# Patient Record
Sex: MALE | Race: WHITE
[De-identification: names, ages, dates, MRNs, and addresses within clinical notes are randomized per-mention and may not be internally consistent; named-entity substitution may affect disease eponyms.]

---

## 2018-03-13 PROBLEM — Z00.00 ENCOUNTER FOR PREVENTIVE HEALTH EXAMINATION: Status: ACTIVE | Noted: 2018-03-13

## 2018-05-11 ENCOUNTER — APPOINTMENT (OUTPATIENT)
Dept: CARDIOLOGY | Facility: CLINIC | Age: 83
End: 2018-05-11

## 2018-09-21 ENCOUNTER — APPOINTMENT (OUTPATIENT)
Dept: CARDIOLOGY | Facility: CLINIC | Age: 83
End: 2018-09-21
Payer: MEDICARE

## 2018-09-21 ENCOUNTER — NON-APPOINTMENT (OUTPATIENT)
Age: 83
End: 2018-09-21

## 2018-09-21 VITALS
HEART RATE: 72 BPM | BODY MASS INDEX: 19.29 KG/M2 | OXYGEN SATURATION: 99 % | WEIGHT: 113 LBS | SYSTOLIC BLOOD PRESSURE: 130 MMHG | HEIGHT: 64 IN | DIASTOLIC BLOOD PRESSURE: 76 MMHG

## 2018-09-21 DIAGNOSIS — Z82.49 FAMILY HISTORY OF ISCHEMIC HEART DISEASE AND OTHER DISEASES OF THE CIRCULATORY SYSTEM: ICD-10-CM

## 2018-09-21 DIAGNOSIS — Z87.891 PERSONAL HISTORY OF NICOTINE DEPENDENCE: ICD-10-CM

## 2018-09-21 DIAGNOSIS — I35.8 OTHER NONRHEUMATIC AORTIC VALVE DISORDERS: ICD-10-CM

## 2018-09-21 DIAGNOSIS — I25.10 ATHEROSCLEROTIC HEART DISEASE OF NATIVE CORONARY ARTERY W/OUT ANGINA PECTORIS: ICD-10-CM

## 2018-09-21 PROCEDURE — 93000 ELECTROCARDIOGRAM COMPLETE: CPT

## 2018-09-21 PROCEDURE — 99204 OFFICE O/P NEW MOD 45 MIN: CPT

## 2018-09-22 ENCOUNTER — NON-APPOINTMENT (OUTPATIENT)
Age: 83
End: 2018-09-22

## 2018-09-22 PROBLEM — Z82.49 FAMILY HISTORY OF ACUTE MYOCARDIAL INFARCTION: Status: ACTIVE | Noted: 2018-09-22

## 2018-09-22 PROBLEM — Z87.891 FORMER SMOKER: Status: ACTIVE | Noted: 2018-09-22

## 2018-09-22 PROBLEM — I35.8 AORTIC VALVE SCLEROSIS: Status: ACTIVE | Noted: 2018-09-22

## 2018-09-22 PROBLEM — I25.10 CAD (CORONARY ARTERY DISEASE): Status: ACTIVE | Noted: 2018-09-21

## 2018-09-26 LAB
ALBUMIN SERPL ELPH-MCNC: 4 G/DL
ALP BLD-CCNC: 53 U/L
ALT SERPL-CCNC: 9 U/L
ANION GAP SERPL CALC-SCNC: 11 MMOL/L
AST SERPL-CCNC: 20 U/L
BILIRUB SERPL-MCNC: 0.3 MG/DL
BUN SERPL-MCNC: 24 MG/DL
CALCIUM SERPL-MCNC: 9.2 MG/DL
CHLORIDE SERPL-SCNC: 105 MMOL/L
CHOLEST SERPL-MCNC: 178 MG/DL
CHOLEST/HDLC SERPL: 2.5 RATIO
CO2 SERPL-SCNC: 25 MMOL/L
CREAT SERPL-MCNC: 0.81 MG/DL
GLUCOSE SERPL-MCNC: 74 MG/DL
HDLC SERPL-MCNC: 71 MG/DL
LDLC SERPL CALC-MCNC: 96 MG/DL
POTASSIUM SERPL-SCNC: 4.6 MMOL/L
PROT SERPL-MCNC: 6.5 G/DL
SODIUM SERPL-SCNC: 141 MMOL/L
TRIGL SERPL-MCNC: 54 MG/DL

## 2018-10-20 ENCOUNTER — APPOINTMENT (OUTPATIENT)
Dept: CARDIOLOGY | Facility: CLINIC | Age: 83
End: 2018-10-20
Payer: MEDICARE

## 2018-10-20 PROCEDURE — 93306 TTE W/DOPPLER COMPLETE: CPT

## 2018-10-30 ENCOUNTER — EMERGENCY (EMERGENCY)
Facility: HOSPITAL | Age: 83
LOS: 0 days | Discharge: HOME | End: 2018-10-30
Attending: EMERGENCY MEDICINE | Admitting: EMERGENCY MEDICINE
Payer: MEDICARE

## 2018-10-30 VITALS
DIASTOLIC BLOOD PRESSURE: 72 MMHG | OXYGEN SATURATION: 98 % | HEART RATE: 65 BPM | SYSTOLIC BLOOD PRESSURE: 142 MMHG | RESPIRATION RATE: 18 BRPM | TEMPERATURE: 98 F

## 2018-10-30 VITALS
SYSTOLIC BLOOD PRESSURE: 156 MMHG | WEIGHT: 110.01 LBS | OXYGEN SATURATION: 98 % | DIASTOLIC BLOOD PRESSURE: 78 MMHG | RESPIRATION RATE: 18 BRPM | TEMPERATURE: 98 F | HEIGHT: 64 IN | HEART RATE: 68 BPM

## 2018-10-30 DIAGNOSIS — Z88.0 ALLERGY STATUS TO PENICILLIN: ICD-10-CM

## 2018-10-30 DIAGNOSIS — N39.0 URINARY TRACT INFECTION, SITE NOT SPECIFIED: ICD-10-CM

## 2018-10-30 DIAGNOSIS — T83.9XXA UNSPECIFIED COMPLICATION OF GENITOURINARY PROSTHETIC DEVICE, IMPLANT AND GRAFT, INITIAL ENCOUNTER: ICD-10-CM

## 2018-10-30 DIAGNOSIS — R33.9 RETENTION OF URINE, UNSPECIFIED: ICD-10-CM

## 2018-10-30 LAB
ALBUMIN SERPL ELPH-MCNC: 3.6 G/DL — SIGNIFICANT CHANGE UP (ref 3.3–5)
ALP SERPL-CCNC: 53 U/L — SIGNIFICANT CHANGE UP (ref 40–120)
ALT FLD-CCNC: 19 U/L — SIGNIFICANT CHANGE UP (ref 12–78)
ANION GAP SERPL CALC-SCNC: 5 MMOL/L — SIGNIFICANT CHANGE UP (ref 5–17)
APPEARANCE UR: ABNORMAL
AST SERPL-CCNC: 24 U/L — SIGNIFICANT CHANGE UP (ref 15–37)
BACTERIA # UR AUTO: ABNORMAL
BASOPHILS # BLD AUTO: 0.03 K/UL — SIGNIFICANT CHANGE UP (ref 0–0.2)
BASOPHILS NFR BLD AUTO: 0.4 % — SIGNIFICANT CHANGE UP (ref 0–2)
BILIRUB SERPL-MCNC: 0.3 MG/DL — SIGNIFICANT CHANGE UP (ref 0.2–1.2)
BILIRUB UR-MCNC: NEGATIVE — SIGNIFICANT CHANGE UP
BUN SERPL-MCNC: 36 MG/DL — HIGH (ref 7–23)
CALCIUM SERPL-MCNC: 8.6 MG/DL — SIGNIFICANT CHANGE UP (ref 8.5–10.1)
CHLORIDE SERPL-SCNC: 105 MMOL/L — SIGNIFICANT CHANGE UP (ref 96–108)
CO2 SERPL-SCNC: 28 MMOL/L — SIGNIFICANT CHANGE UP (ref 22–31)
COLOR SPEC: YELLOW — SIGNIFICANT CHANGE UP
CREAT SERPL-MCNC: 1.22 MG/DL — SIGNIFICANT CHANGE UP (ref 0.5–1.3)
DIFF PNL FLD: ABNORMAL
EOSINOPHIL # BLD AUTO: 0 K/UL — SIGNIFICANT CHANGE UP (ref 0–0.5)
EOSINOPHIL NFR BLD AUTO: 0 % — SIGNIFICANT CHANGE UP (ref 0–6)
EPI CELLS # UR: SIGNIFICANT CHANGE UP
GLUCOSE SERPL-MCNC: 99 MG/DL — SIGNIFICANT CHANGE UP (ref 70–99)
GLUCOSE UR QL: NEGATIVE MG/DL — SIGNIFICANT CHANGE UP
HCT VFR BLD CALC: 34.6 % — LOW (ref 39–50)
HGB BLD-MCNC: 11.4 G/DL — LOW (ref 13–17)
HYALINE CASTS # UR AUTO: ABNORMAL /LPF
IMM GRANULOCYTES NFR BLD AUTO: 0.3 % — SIGNIFICANT CHANGE UP (ref 0–1.5)
KETONES UR-MCNC: NEGATIVE — SIGNIFICANT CHANGE UP
LEUKOCYTE ESTERASE UR-ACNC: ABNORMAL
LYMPHOCYTES # BLD AUTO: 0.48 K/UL — LOW (ref 1–3.3)
LYMPHOCYTES # BLD AUTO: 6.3 % — LOW (ref 13–44)
MANUAL SMEAR VERIFICATION: SIGNIFICANT CHANGE UP
MCHC RBC-ENTMCNC: 28.2 PG — SIGNIFICANT CHANGE UP (ref 27–34)
MCHC RBC-ENTMCNC: 32.9 GM/DL — SIGNIFICANT CHANGE UP (ref 32–36)
MCV RBC AUTO: 85.6 FL — SIGNIFICANT CHANGE UP (ref 80–100)
MONOCYTES # BLD AUTO: 0.65 K/UL — SIGNIFICANT CHANGE UP (ref 0–0.9)
MONOCYTES NFR BLD AUTO: 8.6 % — SIGNIFICANT CHANGE UP (ref 2–14)
NEUTROPHILS # BLD AUTO: 6.38 K/UL — SIGNIFICANT CHANGE UP (ref 1.8–7.4)
NEUTROPHILS NFR BLD AUTO: 84.4 % — HIGH (ref 43–77)
NITRITE UR-MCNC: NEGATIVE — SIGNIFICANT CHANGE UP
NRBC # BLD: 0 /100 WBCS — SIGNIFICANT CHANGE UP (ref 0–0)
PH UR: 7 — SIGNIFICANT CHANGE UP (ref 5–8)
PLAT MORPH BLD: NORMAL — SIGNIFICANT CHANGE UP
PLATELET # BLD AUTO: 183 K/UL — SIGNIFICANT CHANGE UP (ref 150–400)
PLATELET COUNT - ESTIMATE: NORMAL — SIGNIFICANT CHANGE UP
POTASSIUM SERPL-MCNC: 4.6 MMOL/L — SIGNIFICANT CHANGE UP (ref 3.5–5.3)
POTASSIUM SERPL-SCNC: 4.6 MMOL/L — SIGNIFICANT CHANGE UP (ref 3.5–5.3)
PROT SERPL-MCNC: 7.3 GM/DL — SIGNIFICANT CHANGE UP (ref 6–8.3)
PROT UR-MCNC: 100 MG/DL
RBC # BLD: 4.04 M/UL — LOW (ref 4.2–5.8)
RBC # FLD: 14.6 % — HIGH (ref 10.3–14.5)
RBC BLD AUTO: NORMAL — SIGNIFICANT CHANGE UP
RBC CASTS # UR COMP ASSIST: >50 /HPF (ref 0–4)
SODIUM SERPL-SCNC: 138 MMOL/L — SIGNIFICANT CHANGE UP (ref 135–145)
SP GR SPEC: 1.01 — SIGNIFICANT CHANGE UP (ref 1.01–1.02)
UROBILINOGEN FLD QL: NEGATIVE MG/DL — SIGNIFICANT CHANGE UP
WBC # BLD: 7.56 K/UL — SIGNIFICANT CHANGE UP (ref 3.8–10.5)
WBC # FLD AUTO: 7.56 K/UL — SIGNIFICANT CHANGE UP (ref 3.8–10.5)
WBC UR QL: >50

## 2018-10-30 PROCEDURE — 99284 EMERGENCY DEPT VISIT MOD MDM: CPT

## 2018-10-30 RX ORDER — MOXIFLOXACIN HYDROCHLORIDE TABLETS, 400 MG 400 MG/1
1 TABLET, FILM COATED ORAL
Qty: 14 | Refills: 0
Start: 2018-10-30 | End: 2018-11-05

## 2018-10-30 NOTE — ED ADULT NURSE NOTE - CHIEF COMPLAINT QUOTE
pt BIBEMS from [facility] for urinary retention. pt self cath's 4x day at baseline, unable to obtain urine. c/o lower abd pain.

## 2018-10-30 NOTE — ED PROVIDER NOTE - NEUROLOGICAL, MLM
Appendectomy, laparoscopic, pediatric  05/09/2017    Active  NDENNING  Drainage of abdominal abscess  05/09/2017    Active  NDENNING
Alert and oriented, no focal deficits, no motor or sensory deficits.

## 2018-10-30 NOTE — ED PROVIDER NOTE - PROGRESS NOTE DETAILS
Patient urinalysis with probable UTI.  Patient agreeable to marley catheter for symptoms of pain with straight catheterization.  Will treat with cipro, he will follow up with his urologist for further evaluation.  Strict return precautions reviewed if fever develops or worsening pain -Julia Young PA-C

## 2018-10-30 NOTE — ED PROVIDER NOTE - MEDICAL DECISION MAKING DETAILS
Pt with urinary retention.  Cathed in ED.  Berry placed by nursing.  Probably UTI, treat with Cipro.

## 2018-10-30 NOTE — ED PROVIDER NOTE - OBJECTIVE STATEMENT
86 yo M with PMHx of a neurogenic bladder, normally self catheterizes 4x a day, presents to ED reporting that since yesterday he has been having pain with catheterization and cannot tolerate doing it and now his bladder is full and distended and he is having urgency sensation and suprapubic discomfort.  He denies fever, chills, N/V.

## 2018-10-30 NOTE — ED ADULT NURSE NOTE - OBJECTIVE STATEMENT
Pt self cath's - has only been getting approx. 150mL, c/o bladder distention and pain.  Pt straight cath'd upon arrival with approx. 900mL cloudy yellow urine removed.  Pt with no complaints at this time.  Total relief of discomfort.

## 2018-10-30 NOTE — ED ADULT TRIAGE NOTE - CHIEF COMPLAINT QUOTE
Name band;
pt BIBEMS from [facility] for urinary retention. pt self cath's 4x day at baseline, unable to obtain urine. c/o lower abd pain.

## 2018-11-01 LAB
-  AMPICILLIN/SULBACTAM: SIGNIFICANT CHANGE UP
-  CEFAZOLIN: SIGNIFICANT CHANGE UP
-  GENTAMICIN: SIGNIFICANT CHANGE UP
-  OXACILLIN: SIGNIFICANT CHANGE UP
-  RIFAMPIN: SIGNIFICANT CHANGE UP
-  TETRACYCLINE: SIGNIFICANT CHANGE UP
-  TRIMETHOPRIM/SULFAMETHOXAZOLE: SIGNIFICANT CHANGE UP
-  VANCOMYCIN: SIGNIFICANT CHANGE UP
CULTURE RESULTS: SIGNIFICANT CHANGE UP
METHOD TYPE: SIGNIFICANT CHANGE UP
ORGANISM # SPEC MICROSCOPIC CNT: SIGNIFICANT CHANGE UP
ORGANISM # SPEC MICROSCOPIC CNT: SIGNIFICANT CHANGE UP
SPECIMEN SOURCE: SIGNIFICANT CHANGE UP

## 2018-11-02 RX ORDER — AZTREONAM 2 G
1 VIAL (EA) INJECTION
Qty: 20 | Refills: 0
Start: 2018-11-02 | End: 2018-11-11

## 2018-11-02 NOTE — ED POST DISCHARGE NOTE - OTHER COMMUNICATION
Daughter called back and requested abx treatment and to send results to this urologist ([facility]- [phone]) Rx for bactrim sent. -Donnie Lee PA-C

## 2018-11-02 NOTE — ED POST DISCHARGE NOTE - RESULT SUMMARY
+UC. Called and spoke with pt. Pt was told that he had a urine infection but was told to hold off on antibitoics. Pt states he is currently symptomatic and has a f/u with his urologist next week to f/u with his recent visit to  [facility]. Pt was given the risks and advised that having an infection should be treated with antibiotics because things could get worse and progress to a worsening infection. Pt still adamant to hold off and not take any antibiotics. Strict return pprecautions given to to the pt. -Donnie Lee PA-C

## 2018-11-11 ENCOUNTER — EMERGENCY (EMERGENCY)
Facility: HOSPITAL | Age: 83
LOS: 0 days | Discharge: HOME | End: 2018-11-11
Attending: FAMILY MEDICINE | Admitting: EMERGENCY MEDICINE
Payer: MEDICARE

## 2018-11-11 VITALS
SYSTOLIC BLOOD PRESSURE: 127 MMHG | TEMPERATURE: 98 F | HEART RATE: 60 BPM | DIASTOLIC BLOOD PRESSURE: 67 MMHG | OXYGEN SATURATION: 100 % | RESPIRATION RATE: 18 BRPM

## 2018-11-11 VITALS
WEIGHT: 111.99 LBS | OXYGEN SATURATION: 98 % | DIASTOLIC BLOOD PRESSURE: 63 MMHG | RESPIRATION RATE: 18 BRPM | SYSTOLIC BLOOD PRESSURE: 139 MMHG | HEART RATE: 59 BPM | TEMPERATURE: 97 F | HEIGHT: 65 IN

## 2018-11-11 DIAGNOSIS — Z87.891 PERSONAL HISTORY OF NICOTINE DEPENDENCE: ICD-10-CM

## 2018-11-11 DIAGNOSIS — Z88.0 ALLERGY STATUS TO PENICILLIN: ICD-10-CM

## 2018-11-11 DIAGNOSIS — R31.9 HEMATURIA, UNSPECIFIED: ICD-10-CM

## 2018-11-11 DIAGNOSIS — R33.9 RETENTION OF URINE, UNSPECIFIED: ICD-10-CM

## 2018-11-11 LAB
APPEARANCE UR: CLEAR — SIGNIFICANT CHANGE UP
BACTERIA # UR AUTO: ABNORMAL
BILIRUB UR-MCNC: NEGATIVE — SIGNIFICANT CHANGE UP
COLOR SPEC: YELLOW — SIGNIFICANT CHANGE UP
DIFF PNL FLD: ABNORMAL
EPI CELLS # UR: SIGNIFICANT CHANGE UP
GLUCOSE UR QL: NEGATIVE MG/DL — SIGNIFICANT CHANGE UP
KETONES UR-MCNC: NEGATIVE — SIGNIFICANT CHANGE UP
LEUKOCYTE ESTERASE UR-ACNC: ABNORMAL
NITRITE UR-MCNC: POSITIVE
PH UR: 6 — SIGNIFICANT CHANGE UP (ref 5–8)
PROT UR-MCNC: 100 MG/DL
RBC CASTS # UR COMP ASSIST: >50 /HPF (ref 0–4)
SP GR SPEC: 1.01 — SIGNIFICANT CHANGE UP (ref 1.01–1.02)
UROBILINOGEN FLD QL: NEGATIVE MG/DL — SIGNIFICANT CHANGE UP
WBC UR QL: >50

## 2018-11-11 PROCEDURE — 99284 EMERGENCY DEPT VISIT MOD MDM: CPT

## 2018-11-11 RX ORDER — AZTREONAM 2 G
1 VIAL (EA) INJECTION
Qty: 10 | Refills: 0
Start: 2018-11-11 | End: 2018-11-15

## 2018-11-11 NOTE — ED ADULT NURSE NOTE - NSIMPLEMENTINTERV_GEN_ALL_ED
Implemented All Fall Risk Interventions:  [place] to call system. Call bell, personal items and telephone within reach. Instruct patient to call for assistance. Room bathroom lighting operational. Non-slip footwear when patient is off stretcher. Physically safe environment: no spills, clutter or unnecessary equipment. Stretcher in lowest position, wheels locked, appropriate side rails in place. Provide visual cue, wrist band, yellow gown, etc. Monitor gait and stability. Monitor for mental status changes and reorient to person, place, and time. Review medications for side effects contributing to fall risk. Reinforce activity limits and safety measures with patient and family.

## 2018-11-11 NOTE — ED PROVIDER NOTE - OBJECTIVE STATEMENT
85 yo pt with hx of neurogenic bladder and straight cath 4 times a day presents for bloody urine and unable to void since 3am.  Last week pt with episode of urinary retention and uti and currently on bactrim.  Per daughter, today pt cathed and had blood return.  Pt has not been able to urinate since then.  No fall, no travel, pt lives at [facility].  Urologist = Dr. Bone.

## 2018-11-11 NOTE — ED ADULT NURSE REASSESSMENT NOTE - NS ED NURSE REASSESS COMMENT FT1
marley drained 1400ml of urine. Changed to leg bag as per md order. pt tolerated well. Pt discharged in a wheelchair. Daughter agrees to take him to [facility].

## 2018-11-11 NOTE — ED ADULT NURSE REASSESSMENT NOTE - NS ED NURSE REASSESS COMMENT FT1
marley catheter inserted as per md order. pt tolerated well. Urine drained and sent to lab. Pending results. pt states feeling better. Daughter at bedside. Will cont to monitor for safety and comfort.

## 2018-11-11 NOTE — ED PROVIDER NOTE - MEDICAL DECISION MAKING DETAILS
85 yo pt presents for urinary retention.  Possible false passage with straight cath attempt earlier today.  plan cont abx and leave Berry in place.  Follow up with urologist in 1-3 days 87 yo pt presents for urinary retention.  Possible false passage with straight cath attempt earlier today.  plan cont abx (bactrim) and leave Berry in place.  Follow up with urologist in 1-3 days

## 2018-11-11 NOTE — ED ADULT NURSE NOTE - OBJECTIVE STATEMENT
85 y/o male awake alert and oriented x4 BIBEMS with daughter from [facility] c/o urinary retention and hematuria. as per daughter, pt has hx dystonic bladder (straight cath himself 4x a day) was seen in ED last week for similar sx. pt had marley cath placed from last visit in ED and was removed by Dr. Rocha in [place] (Urologist) on Thursday. Pt is currently on Bactrim for UTI. Pt noticed hematuria and difficulty emptying bladder with straight cath this morning. Last time pt cath himself was 10:30pm last night. c/o lower abd pain. Denies fever/chills, dysuria.

## 2018-11-13 RX ORDER — NITROFURANTOIN MACROCRYSTAL 50 MG
1 CAPSULE ORAL
Qty: 14 | Refills: 0
Start: 2018-11-13 | End: 2018-11-19

## 2018-11-13 NOTE — ED POST DISCHARGE NOTE - RESULT SUMMARY
urine culture positive for e.coli, resistant to bactrim which pt was prescribed, called pt's assisted living, advised RN of results, sent Rx for macrobid which bacteria is sensitive to to pt's pharmacy and faxed copy of culture results to assisted living, advised RN to have pt f/u with urologist bill Goodrich PA-C

## 2018-11-18 ENCOUNTER — EMERGENCY (EMERGENCY)
Facility: HOSPITAL | Age: 83
LOS: 0 days | Discharge: HOME | End: 2018-11-18
Attending: EMERGENCY MEDICINE | Admitting: EMERGENCY MEDICINE
Payer: MEDICARE

## 2018-11-18 VITALS
WEIGHT: 106.04 LBS | OXYGEN SATURATION: 100 % | HEART RATE: 58 BPM | SYSTOLIC BLOOD PRESSURE: 130 MMHG | TEMPERATURE: 98 F | HEIGHT: 63 IN | DIASTOLIC BLOOD PRESSURE: 82 MMHG | RESPIRATION RATE: 18 BRPM

## 2018-11-18 DIAGNOSIS — T83.091A OTHER MECHANICAL COMPLICATION OF INDWELLING URETHRAL CATHETER, INITIAL ENCOUNTER: ICD-10-CM

## 2018-11-18 DIAGNOSIS — T83.9XXA UNSPECIFIED COMPLICATION OF GENITOURINARY PROSTHETIC DEVICE, IMPLANT AND GRAFT, INITIAL ENCOUNTER: ICD-10-CM

## 2018-11-18 DIAGNOSIS — R33.9 RETENTION OF URINE, UNSPECIFIED: ICD-10-CM

## 2018-11-18 DIAGNOSIS — Z88.0 ALLERGY STATUS TO PENICILLIN: ICD-10-CM
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
## 2018-11-18 PROCEDURE — 99284 EMERGENCY DEPT VISIT MOD MDM: CPT | Mod: 25

## 2018-11-18 NOTE — ED PROVIDER NOTE - NSFOLLOWUPINSTRUCTIONS_ED_ALL_ED_FT
please follow up with your doctor in 2-3 days.   keep Berry in place until follow up.   continue with antibiotic as prescribed.

## 2018-11-18 NOTE — ED ADULT NURSE NOTE - CHPI ED NUR SYMPTOMS NEG
no tingling/no weakness/no fever/no dizziness/no decreased eating/drinking/no chills/no nausea/no vomiting

## 2018-11-18 NOTE — ED ADULT NURSE NOTE - OBJECTIVE STATEMENT
Pt presented to ED for marley cath complications. As per pt's son at the bedside, pt's marley cath has not been draining since 2100hrs per nursing home staff. Upon  arrival to ED, no drainage noted on the FC and pt c/o bladder discomfort due to fullness.

## 2018-11-18 NOTE — ED ADULT NURSE NOTE - NSIMPLEMENTINTERV_GEN_ALL_ED
Implemented All Universal Safety Interventions:  [place] to call system. Call bell, personal items and telephone within reach. Instruct patient to call for assistance. Room bathroom lighting operational. Non-slip footwear when patient is off stretcher. Physically safe environment: no spills, clutter or unnecessary equipment. Stretcher in lowest position, wheels locked, appropriate side rails in place.

## 2018-11-18 NOTE — ED ADULT NURSE REASSESSMENT NOTE - NS ED NURSE REASSESS COMMENT FT1
Pt marley cath changed to 16FR catheter at this time. Pt tolerated the procedure well. 600 ml of initial return noted. Currently on ABX for UTI. Pt okay to be discharged at this time.

## 2018-11-18 NOTE — ED PROVIDER NOTE - OBJECTIVE STATEMENT
87 y/o male in ED with marley in place c/o retention since 2100 last night.   pt denies any fever, HA, cp, sob, n/v/d/abd pain.    states drank some water with still no drainage.   pt states currently on new abx for uti

## 2019-04-04 NOTE — ED ADULT TRIAGE NOTE - MODE OF ARRIVAL
See your Primary Doctor and Cardiologist in next week for follow up and reevaluation.    Change LABETALOL 600 mg TWICE a day (take too 300 mg tablets twice a day).    Follow heart healthy diet.    Seek immediate medical care for new/worsening symptoms/concerns. EMS

## 2020-04-07 ENCOUNTER — EMERGENCY (EMERGENCY)
Facility: HOSPITAL | Age: 85
LOS: 0 days | Discharge: HOME | End: 2020-04-08
Attending: EMERGENCY MEDICINE
Payer: MEDICARE

## 2020-04-07 VITALS
SYSTOLIC BLOOD PRESSURE: 147 MMHG | DIASTOLIC BLOOD PRESSURE: 64 MMHG | RESPIRATION RATE: 18 BRPM | TEMPERATURE: 99 F | WEIGHT: 160.06 LBS | HEIGHT: 66 IN | HEART RATE: 94 BPM | OXYGEN SATURATION: 96 %

## 2020-04-07 VITALS
DIASTOLIC BLOOD PRESSURE: 90 MMHG | OXYGEN SATURATION: 97 % | RESPIRATION RATE: 18 BRPM | SYSTOLIC BLOOD PRESSURE: 141 MMHG | HEART RATE: 84 BPM | TEMPERATURE: 99 F

## 2020-04-07 DIAGNOSIS — R33.9 RETENTION OF URINE, UNSPECIFIED: ICD-10-CM

## 2020-04-07 DIAGNOSIS — S46.912A STRAIN OF UNSPECIFIED MUSCLE, FASCIA AND TENDON AT SHOULDER AND UPPER ARM LEVEL, LEFT ARM, INITIAL ENCOUNTER: ICD-10-CM
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
## 2020-04-07 DIAGNOSIS — S16.1XXA STRAIN OF MUSCLE, FASCIA AND TENDON AT NECK LEVEL, INITIAL ENCOUNTER: ICD-10-CM
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
## 2020-04-07 DIAGNOSIS — Z88.0 ALLERGY STATUS TO PENICILLIN: ICD-10-CM

## 2020-04-07 DIAGNOSIS — M25.512 PAIN IN LEFT SHOULDER: ICD-10-CM

## 2020-04-07 DIAGNOSIS — S09.90XA UNSPECIFIED INJURY OF HEAD, INITIAL ENCOUNTER: ICD-10-CM

## 2020-04-07 PROCEDURE — 70450 CT HEAD/BRAIN W/O DYE: CPT

## 2020-04-07 PROCEDURE — 73060 X-RAY EXAM OF HUMERUS: CPT | Mod: 26,LT

## 2020-04-07 PROCEDURE — 72170 X-RAY EXAM OF PELVIS: CPT | Mod: 26

## 2020-04-07 PROCEDURE — 99284 EMERGENCY DEPT VISIT MOD MDM: CPT

## 2020-04-07 PROCEDURE — 73030 X-RAY EXAM OF SHOULDER: CPT | Mod: LT

## 2020-04-07 PROCEDURE — 72125 CT NECK SPINE W/O DYE: CPT

## 2020-04-07 PROCEDURE — 71045 X-RAY EXAM CHEST 1 VIEW: CPT

## 2020-04-07 PROCEDURE — 73030 X-RAY EXAM OF SHOULDER: CPT | Mod: 26,LT

## 2020-04-07 PROCEDURE — 70450 CT HEAD/BRAIN W/O DYE: CPT | Mod: 26

## 2020-04-07 PROCEDURE — 73060 X-RAY EXAM OF HUMERUS: CPT | Mod: LT

## 2020-04-07 PROCEDURE — 71045 X-RAY EXAM CHEST 1 VIEW: CPT | Mod: 26

## 2020-04-07 PROCEDURE — 72125 CT NECK SPINE W/O DYE: CPT | Mod: 26

## 2020-04-07 PROCEDURE — 72170 X-RAY EXAM OF PELVIS: CPT

## 2020-04-07 NOTE — ED PROVIDER NOTE - MUSCULOSKELETAL, MLM
+LUE in sling. RUE nontender, good AROM without pain. +b/l SLR 35+ degrees without pain, no focal tenderness. +Left shoulder mildly tender, no deformity. LUE distal nontender, normal radial pulse, hand digits move well. Neck no midline tenderness nor deformity, supple with some decreased ROM and some discomfort. Back and pelvis nontender, stable.

## 2020-04-07 NOTE — ED PROVIDER NOTE - ENMT, MLM
Airway patent, Nasal mucosa clear. Mouth with slightly dry mucosa. Throat has no vesicles, no oropharyngeal exudates and uvula is midline. NC/AT. -battles. No clinical evidence of facial injury.

## 2020-04-07 NOTE — ED PROVIDER NOTE - PATIENT PORTAL LINK FT
You can access the FollowMyHealth Patient Portal offered by [facility] by registering at the following website: [email or web address]. By joining [employer]’s FollowMyHealth portal, you will also be able to view your health information using other applications (apps) compatible with our system.

## 2020-04-07 NOTE — ED PROVIDER NOTE - CLINICAL SUMMARY MEDICAL DECISION MAKING FREE TEXT BOX
86 y/o male with PMHx of IBS, GERD, BPH, and idiopathic neuropathy [facility] c/o neck pain and left shoulder pain. Pt does not meet criteria for neuro/trauma alert.   Plan: CT head and c-spine, XR chest, pelvis, left shoulder/humerus. Observe and reassess.

## 2020-04-07 NOTE — ED ADULT TRIAGE NOTE - CHIEF COMPLAINT QUOTE
Pt BIBEMS c/o unwitnessed fall. Not on any anticoags. Denies LOC, head injury. A/ox3 at triage. C/O left scapula pain.

## 2020-04-07 NOTE — ED PROVIDER NOTE - OBJECTIVE STATEMENT
88 y/o male with PMHx of IBS, GERD, BPH, and idiopathic neuropathy presents to the ED BIBEMS from [facility] assisted living s/p fall. Pt states he lost his balance, put out his left arm and it got wedged against a wall, called for help for some time before someone assisted him. Denies falling to the floor. Endorses +neck pain and +left shoulder pain. Describes pain as an aching. No LOC. Also notes 3 days of mild +cough. No fever or SOB. Walks with a cane at baseline. Not on antiplatelet nor AC meds.

## 2020-04-07 NOTE — ED PROVIDER NOTE - CPE EDP RESP NORM
Quality 110: Preventive Care And Screening: Influenza Immunization: Influenza Immunization previously received during influenza season Quality 111:Pneumonia Vaccination Status For Older Adults: Pneumococcal Vaccination Previously Received Detail Level: Detailed normal...

## 2020-04-07 NOTE — ED PROVIDER NOTE - NSFOLLOWUPINSTRUCTIONS_ED_ALL_ED_FT
Diagnosis: Head injury, cervical strain, L shoulder strain, fall from standing height.  Tylenol 325 mg 2 tabs every 6 hours as needed for pain to neck/shoulder.  Continue regular medications as per routine.  Follow up with own doctor or Wilmer ALAN this week.  Fall precautions.  We encourage Physical therapy of neck, L shoulder.      ED evaluation and management discussed with the patient and family (if available) in detail.  Close PMD follow up encouraged.  Strict ED return instructions discussed in detail and patient given the opportunity to ask any questions about their discharge diagnosis and instructions. Patient verbalized understanding.        Closed Head Injury    A closed head injury is an injury to your head that may or may not involve a traumatic brain injury (TBI). Symptoms of TBI can be short or long lasting and include headache, dizziness, interference with memory or speech, fatigue, confusion, changes in sleep, mood changes, nausea, depression/anxiety, and dulling of senses. Make sure to obtain proper rest which includes getting plenty of sleep, avoiding excessive visual stimulation, and avoiding activities that may cause physical or mental stress. Avoid any situation where there is potential for another head injury, including sports.    SEEK IMMEDIATE MEDICAL CARE IF YOU HAVE ANY OF THE FOLLOWING SYMPTOMS: unusual drowsiness, vomiting, severe dizziness, seizures, lightheadedness, muscular weakness, different pupil sizes, visual changes, or clear or bloody discharge from your ears or nose.        Cervical Strain    WHAT YOU NEED TO KNOW:    A cervical strain is a stretched or torn muscle or tendon in your neck. Tendons are strong tissues that connect muscles to bones.    DISCHARGE INSTRUCTIONS:    Return to the emergency department if:     You have pain or numbness from your shoulder down to your hand.      You have problems with your vision, hearing, or balance.      You feel confused or cannot concentrate.      You have problems with movement and strength.    Call your doctor if:     You have increased swelling or pain in your neck.       You have questions or concerns about your condition or care.    Medicines: You may need any of the following:     Acetaminophen decreases pain and fever. It is available without a doctor's order. Ask how much to take and how often to take it. Follow directions. Read the labels of all other medicines you are using to see if they also contain acetaminophen, or ask your doctor or pharmacist. Acetaminophen can cause liver damage if not taken correctly. Do not use more than 4 grams (4,000 milligrams) total of acetaminophen in one day.       NSAIDs, such as ibuprofen, help decrease swelling, pain, and fever. This medicine is available with or without a doctor's order. NSAIDs can cause stomach bleeding or kidney problems in certain people. If you take blood thinner medicine, always ask your healthcare provider if NSAIDs are safe for you. Always read the medicine label and follow directions.      Muscle relaxers help decrease pain and muscle spasms.      Prescription pain medicine may be given. Ask your healthcare provider how to take this medicine safely. Some prescription pain medicines contain acetaminophen. Do not take other medicines that contain acetaminophen without talking to your healthcare provider. Too much acetaminophen may cause liver damage. Prescription pain medicine may cause constipation. Ask your healthcare provider how to prevent or treat constipation.       Take your medicine as directed. Contact your healthcare provider if you think your medicine is not helping or if you have side effects. Tell him or her if you are allergic to any medicine. Keep a list of the medicines, vitamins, and herbs you take. Include the amounts, and when and why you take them. Bring the list or the pill bottles to follow-up visits. Carry your medicine list with you in case of an emergency.    Manage your symptoms:     Apply heat on your neck for 15 to 20 minutes, 4 to 6 times a day or as directed. Heat helps decrease pain, stiffness, and muscle spasms.      Begin gentle neck exercises as soon as you can move your neck without pain. Exercises will help decrease stiffness and improve the strength and movement of your neck. Ask your healthcare provider what kind of exercises you should do.      Gradually return to your usual activities as directed. Stop if you have pain. Avoid activities that can cause more damage to your neck, such as heavy lifting or strenuous exercise.      Sleep without a pillow to help decrease pain. Instead, roll a small towel tightly and place it under your neck.       Go to physical therapy as directed. A physical therapist teaches you exercises to help improve movement and strength, and to decrease pain.     Prevent another neck injury:     Drive safely. Make sure everyone in your car wears a seatbelt. A seatbelt can save your life if you are in an accident. Do not use your cell phone when you are driving. This could distract you and cause an accident. Pull over if you need to make a call or send a text message.       Wear helmets, lifejackets, and protective gear. Always wear a helmet when you ride a bike or motorcycle, go skiing, or play sports that could cause a head injury. Wear protective equipment when you play sports. Wear a lifejacket when you are on a boat or doing water sports.     Follow up with your doctor as directed: You may be referred to an orthopedist or physical therapies. Write down your questions so you remember to ask them during your visits. Diagnosis: Head injury, cervical strain, L shoulder strain, fall from standing height.  Tylenol 325 mg 2 tabs every 6 hours as needed for pain to neck/shoulder.  Continue regular medications as per routine.  Follow up with own doctor or Wilmer ALAN this week.  Fall precautions.  We encourage Physical therapy of neck, L shoulder.      ED evaluation and management discussed with the patient and family (if available) in detail.  Close PMD follow up encouraged.  Strict ED return instructions discussed in detail and patient given the opportunity to ask any questions about their discharge diagnosis and instructions. Patient verbalized understanding.        Closed Head Injury    A closed head injury is an injury to your head that may or may not involve a traumatic brain injury (TBI). Symptoms of TBI can be short or long lasting and include headache, dizziness, interference with memory or speech, fatigue, confusion, changes in sleep, mood changes, nausea, depression/anxiety, and dulling of senses. Make sure to obtain proper rest which includes getting plenty of sleep, avoiding excessive visual stimulation, and avoiding activities that may cause physical or mental stress. Avoid any situation where there is potential for another head injury, including sports.    SEEK IMMEDIATE MEDICAL CARE IF YOU HAVE ANY OF THE FOLLOWING SYMPTOMS: unusual drowsiness, vomiting, severe dizziness, seizures, lightheadedness, muscular weakness, different pupil sizes, visual changes, or clear or bloody discharge from your ears or nose.        Cervical Strain    WHAT YOU NEED TO KNOW:    A cervical strain is a stretched or torn muscle or tendon in your neck. Tendons are strong tissues that connect muscles to bones.    DISCHARGE INSTRUCTIONS:    Return to the emergency department if:     You have pain or numbness from your shoulder down to your hand.      You have problems with your vision, hearing, or balance.      You feel confused or cannot concentrate.      You have problems with movement and strength.    Call your doctor if:     You have increased swelling or pain in your neck.       You have questions or concerns about your condition or care.    Medicines: You may need any of the following:     Acetaminophen decreases pain and fever. It is available without a doctor's order. Ask how much to take and how often to take it. Follow directions. Read the labels of all other medicines you are using to see if they also contain acetaminophen, or ask your doctor or pharmacist. Acetaminophen can cause liver damage if not taken correctly. Do not use more than 4 grams (4,000 milligrams) total of acetaminophen in one day.       NSAIDs, such as ibuprofen, help decrease swelling, pain, and fever. This medicine is available with or without a doctor's order. NSAIDs can cause stomach bleeding or kidney problems in certain people. If you take blood thinner medicine, always ask your healthcare provider if NSAIDs are safe for you. Always read the medicine label and follow directions.      Muscle relaxers help decrease pain and muscle spasms.      Prescription pain medicine may be given. Ask your healthcare provider how to take this medicine safely. Some prescription pain medicines contain acetaminophen. Do not take other medicines that contain acetaminophen without talking to your healthcare provider. Too much acetaminophen may cause liver damage. Prescription pain medicine may cause constipation. Ask your healthcare provider how to prevent or treat constipation.       Take your medicine as directed. Contact your healthcare provider if you think your medicine is not helping or if you have side effects. Tell him or her if you are allergic to any medicine. Keep a list of the medicines, vitamins, and herbs you take. Include the amounts, and when and why you take them. Bring the list or the pill bottles to follow-up visits. Carry your medicine list with you in case of an emergency.    Manage your symptoms:     Apply heat on your neck for 15 to 20 minutes, 4 to 6 times a day or as directed. Heat helps decrease pain, stiffness, and muscle spasms.      Begin gentle neck exercises as soon as you can move your neck without pain. Exercises will help decrease stiffness and improve the strength and movement of your neck. Ask your healthcare provider what kind of exercises you should do.      Gradually return to your usual activities as directed. Stop if you have pain. Avoid activities that can cause more damage to your neck, such as heavy lifting or strenuous exercise.      Sleep without a pillow to help decrease pain. Instead, roll a small towel tightly and place it under your neck.       Go to physical therapy as directed. A physical therapist teaches you exercises to help improve movement and strength, and to decrease pain.     Prevent another neck injury:     Drive safely. Make sure everyone in your car wears a seatbelt. A seatbelt can save your life if you are in an accident. Do not use your cell phone when you are driving. This could distract you and cause an accident. Pull over if you need to make a call or send a text message.       Wear helmets, lifejackets, and protective gear. Always wear a helmet when you ride a bike or motorcycle, go skiing, or play sports that could cause a head injury. Wear protective equipment when you play sports. Wear a lifejacket when you are on a boat or doing water sports.     Follow up with your doctor as directed: You may be referred to an orthopedist or physical therapies. Write down your questions so you remember to ask them during your visits.        Muscle Strain  A muscle strain is an injury that happens when a muscle is stretched longer than normal. This can happen during a fall, sports, or lifting. This can tear some muscle fibers. Usually, recovery from muscle strain takes 1–2 weeks. Complete healing normally takes 5–6 weeks.  This condition is first treated with PRICE therapy. This involves:  Protecting your muscle from being injured again.Resting your injured muscle.Icing your injured muscle.Applying pressure (compression) to your injured muscle. This may be done with a splint or elastic bandage.Raising (elevating) your injured muscle.Your doctor may also recommend medicine for pain.  Follow these instructions at home:  If you have a splint:     Wear the splint as told by your doctor. Take it off only as told by your doctor.Loosen the splint if your fingers or toes tingle, get numb, or turn cold and blue.Keep the splint clean.If the splint is not waterproof:  Do not let it get wet.Cover it with a watertight covering when you take a bath or a shower.Managing pain, stiffness, and swelling        If directed, put ice on your injured area.  If you have a removable splint, take it off as told by your doctor.Put ice in a plastic bag.Place a towel between your skin and the bag.Leave the ice on for 20 minutes, 2–3 times a day.Move your fingers or toes often. This helps to avoid stiffness and lessen swelling.Raise your injured area above the level of your heart while you are sitting or lying down.Wear an elastic bandage as told by your doctor. Make sure it is not too tight.General instructions     Take over-the-counter and prescription medicines only as told by your doctor.Limit your activity. Rest your injured muscle as told by your doctor. Your doctor may say that gentle movements are okay.If physical therapy was prescribed, do exercises as told by your doctor.Do not put pressure on any part of the splint until it is fully hardened. This may take many hours.Do not use any products that contain nicotine or tobacco, such as cigarettes and e-cigarettes. These can delay bone healing. If you need help quitting, ask your doctor.Warm up before you exercise. This helps to prevent more muscle strains.Ask your doctor when it is safe to drive if you have a splint.Keep all follow-up visits as told by your doctor. This is important.Contact a doctor if:  You have more pain or swelling in your injured area.Get help right away if:  You have any of these problems in your injured area:  You have numbness.You have tingling.You lose a lot of strength.Summary  A muscle strain is an injury that happens when a muscle is stretched longer than normal.This condition is first treated with PRICE therapy. This includes protecting, resting, icing, adding pressure, and raising your injury.Limit your activity. Rest your injured muscle as told by your doctor. Your doctor may say that gentle movements are okay.Warm up before you exercise. This helps to prevent more muscle strains.This information is not intended to replace advice given to you by your health care provider. Make sure you discuss any questions you have with your health care provider.

## 2020-04-07 NOTE — ED PROVIDER NOTE - SECONDARY DIAGNOSIS.
Cervical strain, acute, initial encounter Shoulder strain, left, initial encounter Fall from standing, initial encounter

## 2020-04-07 NOTE — ED PROVIDER NOTE - PROGRESS NOTE DETAILS
Ghanhsyam MCKEON for ED attending, Dr. Morin: Left sling removed, no deformity, +AFROM, will do ambulation trial and d/c back to [facility] if passes. Dr. Morin:  Informed by ED RN that pt passed ambulation trial.  Pt stable for D/C back to [facility] facility, calling for transport.

## 2020-04-07 NOTE — ED PROVIDER NOTE - CARE PLAN
Principal Discharge DX:	Closed head injury, initial encounter  Secondary Diagnosis:	Cervical strain, acute, initial encounter  Secondary Diagnosis:	Shoulder strain, left, initial encounter  Secondary Diagnosis:	Fall from standing, initial encounter

## 2020-04-07 NOTE — ED ADULT NURSE NOTE - OBJECTIVE STATEMENT
pt presents to ED with complaints of unwitnessed fall. pt complaining of left shoulder, humeral, and scapular pain. pt also endorsing neck pain. no other complaints. awaiting CT and Xray results.

## 2020-04-08 PROBLEM — R33.9 RETENTION OF URINE, UNSPECIFIED: Chronic | Status: ACTIVE | Noted: 2018-11-18

## 2020-04-08 NOTE — ED ADULT NURSE REASSESSMENT NOTE - NS ED NURSE REASSESS COMMENT FT1
Patient discharged to daughter Wilda and son-in-law assisted into to car to be returned to [facility].
Patient received at 2215 from BEVERLEY Alvarez.  Patient VS stable as charted.  Patient is has poor short term memory and requires frequent reminders.  patient states he is unable to ambulate without a walker.  [facility] does not have a walker in the ED.  Patient's daughter to be called.  Patient is to be discharged home as per MD Morin.
Patient ambulated with 2 person assist.  Patient to be discharged to family and returned to [facility].  Patient report given to Sean Isabel Nurse at [facility] at 2243. Will continue to monitor patient until family arrives for discharge.  Patient assisted to wheelchair at this time.

## 2020-04-11 ENCOUNTER — INPATIENT (INPATIENT)
Facility: HOSPITAL | Age: 85
LOS: 11 days | DRG: 177 | End: 2020-04-23
Attending: INTERNAL MEDICINE | Admitting: INTERNAL MEDICINE
Payer: MEDICARE

## 2020-04-11 VITALS
WEIGHT: 160.06 LBS | HEART RATE: 98 BPM | HEIGHT: 66 IN | TEMPERATURE: 100 F | RESPIRATION RATE: 18 BRPM | SYSTOLIC BLOOD PRESSURE: 147 MMHG | DIASTOLIC BLOOD PRESSURE: 91 MMHG | OXYGEN SATURATION: 95 %

## 2020-04-11 DIAGNOSIS — N40.1 BENIGN PROSTATIC HYPERPLASIA WITH LOWER URINARY TRACT SYMPTOMS: ICD-10-CM

## 2020-04-11 DIAGNOSIS — E43 UNSPECIFIED SEVERE PROTEIN-CALORIE MALNUTRITION: ICD-10-CM

## 2020-04-11 DIAGNOSIS — Z66 DO NOT RESUSCITATE: ICD-10-CM

## 2020-04-11 DIAGNOSIS — B96.4 PROTEUS (MIRABILIS) (MORGANII) AS THE CAUSE OF DISEASES CLASSIFIED ELSEWHERE: ICD-10-CM

## 2020-04-11 DIAGNOSIS — K58.9 IRRITABLE BOWEL SYNDROME WITHOUT DIARRHEA: ICD-10-CM

## 2020-04-11 DIAGNOSIS — N31.2 FLACCID NEUROPATHIC BLADDER, NOT ELSEWHERE CLASSIFIED: ICD-10-CM

## 2020-04-11 DIAGNOSIS — G60.9 HEREDITARY AND IDIOPATHIC NEUROPATHY, UNSPECIFIED: ICD-10-CM

## 2020-04-11 DIAGNOSIS — I48.0 PAROXYSMAL ATRIAL FIBRILLATION: ICD-10-CM

## 2020-04-11 DIAGNOSIS — J12.89 OTHER VIRAL PNEUMONIA: ICD-10-CM

## 2020-04-11 DIAGNOSIS — Z79.899 OTHER LONG TERM (CURRENT) DRUG THERAPY: ICD-10-CM

## 2020-04-11 DIAGNOSIS — F03.90 UNSPECIFIED DEMENTIA WITHOUT BEHAVIORAL DISTURBANCE: ICD-10-CM

## 2020-04-11 DIAGNOSIS — J96.01 ACUTE RESPIRATORY FAILURE WITH HYPOXIA: ICD-10-CM

## 2020-04-11 DIAGNOSIS — U07.1 COVID-19: ICD-10-CM

## 2020-04-11 DIAGNOSIS — K21.9 GASTRO-ESOPHAGEAL REFLUX DISEASE WITHOUT ESOPHAGITIS: ICD-10-CM

## 2020-04-11 DIAGNOSIS — Z88.8 ALLERGY STATUS TO OTHER DRUGS, MEDICAMENTS AND BIOLOGICAL SUBSTANCES STATUS: ICD-10-CM

## 2020-04-11 DIAGNOSIS — R33.8 OTHER RETENTION OF URINE: ICD-10-CM

## 2020-04-11 DIAGNOSIS — I08.0 RHEUMATIC DISORDERS OF BOTH MITRAL AND AORTIC VALVES: ICD-10-CM

## 2020-04-11 DIAGNOSIS — F05 DELIRIUM DUE TO KNOWN PHYSIOLOGICAL CONDITION: ICD-10-CM

## 2020-04-11 DIAGNOSIS — E87.0 HYPEROSMOLALITY AND HYPERNATREMIA: ICD-10-CM

## 2020-04-11 DIAGNOSIS — Z51.5 ENCOUNTER FOR PALLIATIVE CARE: ICD-10-CM

## 2020-04-11 DIAGNOSIS — Z88.0 ALLERGY STATUS TO PENICILLIN: ICD-10-CM

## 2020-04-11 LAB
ADD ON TEST-SPECIMEN IN LAB: SIGNIFICANT CHANGE UP
ALBUMIN SERPL ELPH-MCNC: 2.6 G/DL — LOW (ref 3.3–5)
ALP SERPL-CCNC: 68 U/L — SIGNIFICANT CHANGE UP (ref 40–120)
ALT FLD-CCNC: 67 U/L — SIGNIFICANT CHANGE UP (ref 12–78)
ANION GAP SERPL CALC-SCNC: 6 MMOL/L — SIGNIFICANT CHANGE UP (ref 5–17)
APTT BLD: 30.5 SEC — SIGNIFICANT CHANGE UP (ref 27.5–36.3)
AST SERPL-CCNC: 128 U/L — HIGH (ref 15–37)
BASOPHILS # BLD AUTO: 0.02 K/UL — SIGNIFICANT CHANGE UP (ref 0–0.2)
BASOPHILS NFR BLD AUTO: 0.2 % — SIGNIFICANT CHANGE UP (ref 0–2)
BILIRUB SERPL-MCNC: 0.4 MG/DL — SIGNIFICANT CHANGE UP (ref 0.2–1.2)
BUN SERPL-MCNC: 28 MG/DL — HIGH (ref 7–23)
CALCIUM SERPL-MCNC: 8.5 MG/DL — SIGNIFICANT CHANGE UP (ref 8.5–10.1)
CHLORIDE SERPL-SCNC: 102 MMOL/L — SIGNIFICANT CHANGE UP (ref 96–108)
CK SERPL-CCNC: 1678 U/L — HIGH (ref 26–308)
CO2 SERPL-SCNC: 26 MMOL/L — SIGNIFICANT CHANGE UP (ref 22–31)
CREAT SERPL-MCNC: 1.42 MG/DL — HIGH (ref 0.5–1.3)
EOSINOPHIL # BLD AUTO: 0 K/UL — SIGNIFICANT CHANGE UP (ref 0–0.5)
EOSINOPHIL NFR BLD AUTO: 0 % — SIGNIFICANT CHANGE UP (ref 0–6)
FIBRINOGEN PPP-MCNC: 1276 MG/DL — HIGH (ref 320–520)
GLUCOSE SERPL-MCNC: 123 MG/DL — HIGH (ref 70–99)
HCT VFR BLD CALC: 39 % — SIGNIFICANT CHANGE UP (ref 39–50)
HGB BLD-MCNC: 12.9 G/DL — LOW (ref 13–17)
IMM GRANULOCYTES NFR BLD AUTO: 0.5 % — SIGNIFICANT CHANGE UP (ref 0–1.5)
INR BLD: 1.47 RATIO — HIGH (ref 0.88–1.16)
LDH SERPL L TO P-CCNC: 539 U/L — HIGH (ref 84–241)
LYMPHOCYTES # BLD AUTO: 0.35 K/UL — LOW (ref 1–3.3)
LYMPHOCYTES # BLD AUTO: 3.3 % — LOW (ref 13–44)
MCHC RBC-ENTMCNC: 27.7 PG — SIGNIFICANT CHANGE UP (ref 27–34)
MCHC RBC-ENTMCNC: 33.1 GM/DL — SIGNIFICANT CHANGE UP (ref 32–36)
MCV RBC AUTO: 83.9 FL — SIGNIFICANT CHANGE UP (ref 80–100)
MONOCYTES # BLD AUTO: 0.58 K/UL — SIGNIFICANT CHANGE UP (ref 0–0.9)
MONOCYTES NFR BLD AUTO: 5.5 % — SIGNIFICANT CHANGE UP (ref 2–14)
NEUTROPHILS # BLD AUTO: 9.64 K/UL — HIGH (ref 1.8–7.4)
NEUTROPHILS NFR BLD AUTO: 90.5 % — HIGH (ref 43–77)
PLATELET # BLD AUTO: 195 K/UL — SIGNIFICANT CHANGE UP (ref 150–400)
POTASSIUM SERPL-MCNC: 4.9 MMOL/L — SIGNIFICANT CHANGE UP (ref 3.5–5.3)
POTASSIUM SERPL-SCNC: 4.9 MMOL/L — SIGNIFICANT CHANGE UP (ref 3.5–5.3)
PROT SERPL-MCNC: 7.7 GM/DL — SIGNIFICANT CHANGE UP (ref 6–8.3)
PROTHROM AB SERPL-ACNC: 16.5 SEC — HIGH (ref 10–12.9)
RBC # BLD: 4.65 M/UL — SIGNIFICANT CHANGE UP (ref 4.2–5.8)
RBC # FLD: 13.5 % — SIGNIFICANT CHANGE UP (ref 10.3–14.5)
SARS-COV-2 RNA SPEC QL NAA+PROBE: DETECTED
SODIUM SERPL-SCNC: 134 MMOL/L — LOW (ref 135–145)
WBC # BLD: 10.64 K/UL — HIGH (ref 3.8–10.5)
WBC # FLD AUTO: 10.64 K/UL — HIGH (ref 3.8–10.5)

## 2020-04-11 PROCEDURE — 92523 SPEECH SOUND LANG COMPREHEN: CPT | Mod: GN

## 2020-04-11 PROCEDURE — 80053 COMPREHEN METABOLIC PANEL: CPT

## 2020-04-11 PROCEDURE — 84145 PROCALCITONIN (PCT): CPT

## 2020-04-11 PROCEDURE — 86140 C-REACTIVE PROTEIN: CPT

## 2020-04-11 PROCEDURE — 84480 ASSAY TRIIODOTHYRONINE (T3): CPT

## 2020-04-11 PROCEDURE — 84443 ASSAY THYROID STIM HORMONE: CPT

## 2020-04-11 PROCEDURE — 36415 COLL VENOUS BLD VENIPUNCTURE: CPT

## 2020-04-11 PROCEDURE — 85379 FIBRIN DEGRADATION QUANT: CPT

## 2020-04-11 PROCEDURE — 83615 LACTATE (LD) (LDH) ENZYME: CPT

## 2020-04-11 PROCEDURE — 85025 COMPLETE CBC W/AUTO DIFF WBC: CPT

## 2020-04-11 PROCEDURE — 85027 COMPLETE CBC AUTOMATED: CPT

## 2020-04-11 PROCEDURE — 71045 X-RAY EXAM CHEST 1 VIEW: CPT | Mod: 26

## 2020-04-11 PROCEDURE — 71045 X-RAY EXAM CHEST 1 VIEW: CPT

## 2020-04-11 PROCEDURE — 87635 SARS-COV-2 COVID-19 AMP PRB: CPT

## 2020-04-11 PROCEDURE — 93306 TTE W/DOPPLER COMPLETE: CPT

## 2020-04-11 PROCEDURE — 80048 BASIC METABOLIC PNL TOTAL CA: CPT

## 2020-04-11 PROCEDURE — 85730 THROMBOPLASTIN TIME PARTIAL: CPT

## 2020-04-11 PROCEDURE — 81001 URINALYSIS AUTO W/SCOPE: CPT

## 2020-04-11 PROCEDURE — 92526 ORAL FUNCTION THERAPY: CPT | Mod: GN

## 2020-04-11 PROCEDURE — 82728 ASSAY OF FERRITIN: CPT

## 2020-04-11 PROCEDURE — 92507 TX SP LANG VOICE COMM INDIV: CPT | Mod: GN

## 2020-04-11 PROCEDURE — 87186 SC STD MICRODIL/AGAR DIL: CPT

## 2020-04-11 PROCEDURE — 83735 ASSAY OF MAGNESIUM: CPT

## 2020-04-11 PROCEDURE — 92610 EVALUATE SWALLOWING FUNCTION: CPT | Mod: GN

## 2020-04-11 PROCEDURE — 84484 ASSAY OF TROPONIN QUANT: CPT

## 2020-04-11 PROCEDURE — 87086 URINE CULTURE/COLONY COUNT: CPT

## 2020-04-11 PROCEDURE — 93010 ELECTROCARDIOGRAM REPORT: CPT

## 2020-04-11 PROCEDURE — 84436 ASSAY OF TOTAL THYROXINE: CPT

## 2020-04-11 RX ORDER — HEPARIN SODIUM 5000 [USP'U]/ML
INJECTION INTRAVENOUS; SUBCUTANEOUS
Qty: 25000 | Refills: 0 | Status: DISCONTINUED | OUTPATIENT
Start: 2020-04-11 | End: 2020-04-13

## 2020-04-11 RX ORDER — HEPARIN SODIUM 5000 [USP'U]/ML
2000 INJECTION INTRAVENOUS; SUBCUTANEOUS EVERY 6 HOURS
Refills: 0 | Status: DISCONTINUED | OUTPATIENT
Start: 2020-04-11 | End: 2020-04-13

## 2020-04-11 RX ORDER — HEPARIN SODIUM 5000 [USP'U]/ML
4500 INJECTION INTRAVENOUS; SUBCUTANEOUS ONCE
Refills: 0 | Status: COMPLETED | OUTPATIENT
Start: 2020-04-11 | End: 2020-04-11

## 2020-04-11 RX ORDER — ACETAMINOPHEN 500 MG
650 TABLET ORAL EVERY 6 HOURS
Refills: 0 | Status: DISCONTINUED | OUTPATIENT
Start: 2020-04-11 | End: 2020-04-23

## 2020-04-11 RX ORDER — HEPARIN SODIUM 5000 [USP'U]/ML
4500 INJECTION INTRAVENOUS; SUBCUTANEOUS EVERY 6 HOURS
Refills: 0 | Status: DISCONTINUED | OUTPATIENT
Start: 2020-04-11 | End: 2020-04-13

## 2020-04-11 RX ADMIN — HEPARIN SODIUM 4500 UNIT(S): 5000 INJECTION INTRAVENOUS; SUBCUTANEOUS at 23:54

## 2020-04-11 RX ADMIN — HEPARIN SODIUM 1100 UNIT(S)/HR: 5000 INJECTION INTRAVENOUS; SUBCUTANEOUS at 23:53

## 2020-04-11 NOTE — ED ADULT NURSE NOTE - OBJECTIVE STATEMENT
Flu like s/sx, cough & fever starting today. Sick contacts at facility. Airborne/contact precautions initiated. O2 sat 92-96% on 2L/NC

## 2020-04-11 NOTE — ED PROVIDER NOTE - CARE PLAN
Principal Discharge DX:	COVID-19  Secondary Diagnosis:	Viral pneumonia  Secondary Diagnosis:	New onset atrial fibrillation  Secondary Diagnosis:	Hypoxemia

## 2020-04-11 NOTE — ED PROVIDER NOTE - OBJECTIVE STATEMENT
86 y/o male with a PMHx of Urinary retention, presents to the ED from [facility]. Pt was last here 4 days ago. Was brought in for fever and cough. +exposure to COVID. Pt is a poor historian, but states that he generally feels unwell. No specific complaints. Timeline is unclear. No other complaints at this time.

## 2020-04-11 NOTE — ED ADULT NURSE NOTE - NSIMPLEMENTINTERV_GEN_ALL_ED
Implemented All Fall with Harm Risk Interventions:  [place] to call system. Call bell, personal items and telephone within reach. Instruct patient to call for assistance. Room bathroom lighting operational. Non-slip footwear when patient is off stretcher. Physically safe environment: no spills, clutter or unnecessary equipment. Stretcher in lowest position, wheels locked, appropriate side rails in place. Provide visual cue, wrist band, yellow gown, etc. Monitor gait and stability. Monitor for mental status changes and reorient to person, place, and time. Review medications for side effects contributing to fall risk. Reinforce activity limits and safety measures with patient and family. Provide visual clues: red socks.

## 2020-04-12 LAB
APPEARANCE UR: ABNORMAL
APTT BLD: 137.1 SEC — CRITICAL HIGH (ref 27.5–36.3)
APTT BLD: 70 SEC — HIGH (ref 27.5–36.3)
APTT BLD: 76.2 SEC — HIGH (ref 27.5–36.3)
BILIRUB UR-MCNC: NEGATIVE — SIGNIFICANT CHANGE UP
COLOR SPEC: YELLOW — SIGNIFICANT CHANGE UP
CRP SERPL-MCNC: 22.74 MG/DL — HIGH (ref 0–0.4)
DIFF PNL FLD: ABNORMAL
FERRITIN SERPL-MCNC: 726 NG/ML — HIGH (ref 30–400)
GLUCOSE UR QL: NEGATIVE MG/DL — SIGNIFICANT CHANGE UP
HCT VFR BLD CALC: 35.6 % — LOW (ref 39–50)
HGB BLD-MCNC: 12 G/DL — LOW (ref 13–17)
KETONES UR-MCNC: ABNORMAL
LEUKOCYTE ESTERASE UR-ACNC: ABNORMAL
MCHC RBC-ENTMCNC: 27.3 PG — SIGNIFICANT CHANGE UP (ref 27–34)
MCHC RBC-ENTMCNC: 33.7 GM/DL — SIGNIFICANT CHANGE UP (ref 32–36)
MCV RBC AUTO: 81.1 FL — SIGNIFICANT CHANGE UP (ref 80–100)
NITRITE UR-MCNC: NEGATIVE — SIGNIFICANT CHANGE UP
PH UR: 9 — HIGH (ref 5–8)
PLATELET # BLD AUTO: 200 K/UL — SIGNIFICANT CHANGE UP (ref 150–400)
PROCALCITONIN SERPL-MCNC: 0.89 NG/ML — HIGH (ref 0.02–0.1)
PROT UR-MCNC: 500 MG/DL
RBC # BLD: 4.39 M/UL — SIGNIFICANT CHANGE UP (ref 4.2–5.8)
RBC # FLD: 13.6 % — SIGNIFICANT CHANGE UP (ref 10.3–14.5)
SP GR SPEC: 1.01 — SIGNIFICANT CHANGE UP (ref 1.01–1.02)
TROPONIN I SERPL-MCNC: 0.48 NG/ML — HIGH (ref 0.01–0.04)
TROPONIN I SERPL-MCNC: 0.5 NG/ML — HIGH (ref 0.01–0.04)
UROBILINOGEN FLD QL: NEGATIVE MG/DL — SIGNIFICANT CHANGE UP
WBC # BLD: 9.65 K/UL — SIGNIFICANT CHANGE UP (ref 3.8–10.5)
WBC # FLD AUTO: 9.65 K/UL — SIGNIFICANT CHANGE UP (ref 3.8–10.5)

## 2020-04-12 PROCEDURE — 99223 1ST HOSP IP/OBS HIGH 75: CPT | Mod: CS,AI

## 2020-04-12 RX ORDER — HYDROXYCHLOROQUINE SULFATE 200 MG
800 TABLET ORAL DAILY
Refills: 0 | Status: COMPLETED | OUTPATIENT
Start: 2020-04-12 | End: 2020-04-12

## 2020-04-12 RX ORDER — ALBUTEROL 90 UG/1
2 AEROSOL, METERED ORAL EVERY 4 HOURS
Refills: 0 | Status: DISCONTINUED | OUTPATIENT
Start: 2020-04-12 | End: 2020-04-23

## 2020-04-12 RX ORDER — ACETAMINOPHEN 500 MG
650 TABLET ORAL EVERY 4 HOURS
Refills: 0 | Status: DISCONTINUED | OUTPATIENT
Start: 2020-04-12 | End: 2020-04-23

## 2020-04-12 RX ORDER — DILTIAZEM HCL 120 MG
60 CAPSULE, EXT RELEASE 24 HR ORAL EVERY 8 HOURS
Refills: 0 | Status: DISCONTINUED | OUTPATIENT
Start: 2020-04-12 | End: 2020-04-14

## 2020-04-12 RX ORDER — HYDROXYCHLOROQUINE SULFATE 200 MG
400 TABLET ORAL EVERY 24 HOURS
Refills: 0 | Status: COMPLETED | OUTPATIENT
Start: 2020-04-13 | End: 2020-04-16

## 2020-04-12 RX ORDER — HYDROXYCHLOROQUINE SULFATE 200 MG
TABLET ORAL
Refills: 0 | Status: COMPLETED | OUTPATIENT
Start: 2020-04-12 | End: 2020-04-16

## 2020-04-12 RX ADMIN — HEPARIN SODIUM 900 UNIT(S)/HR: 5000 INJECTION INTRAVENOUS; SUBCUTANEOUS at 22:39

## 2020-04-12 RX ADMIN — HEPARIN SODIUM 900 UNIT(S)/HR: 5000 INJECTION INTRAVENOUS; SUBCUTANEOUS at 07:37

## 2020-04-12 RX ADMIN — Medication 60 MILLIGRAM(S): at 21:26

## 2020-04-12 RX ADMIN — Medication 80 MILLIGRAM(S): at 21:26

## 2020-04-12 RX ADMIN — Medication 60 MILLIGRAM(S): at 15:21

## 2020-04-12 RX ADMIN — Medication 800 MILLIGRAM(S): at 16:55

## 2020-04-12 RX ADMIN — HEPARIN SODIUM 0 UNIT(S)/HR: 5000 INJECTION INTRAVENOUS; SUBCUTANEOUS at 06:24

## 2020-04-12 RX ADMIN — HEPARIN SODIUM 900 UNIT(S)/HR: 5000 INJECTION INTRAVENOUS; SUBCUTANEOUS at 15:32

## 2020-04-12 NOTE — H&P ADULT - HISTORY OF PRESENT ILLNESS
86 y/o M PMHx significant for IBS, GERD, BPH, urinary retention, and idiopathic neuropathy presents to  from [facility] for further evaluation and management of a 4 day history of intermittent subjective fevers, malaise, shortness of breath, and dry cough. As noted the patient has a known notable recent history for (+) sick contacts at the facility. SaO2 on supplemental O2 w/ 2L/min nasal cannula-> 92%. In the ED the patient was found to be in AFib w/ RVR. Labs => WBC 10.64, Hgb/Hct 12.9/39.0, Fibrinogen 1279, INR 1.47, D-dimer 718, Na 134, BUN/Cr 28/1.42, Alb 2.6, , CRP 22.74, Ferritin 726, , CPK 1678, UA (+), COVID-19 (+). CXR => bilateral infiltrates. In the ED the patient was started on a Heparin gtt per protocol. 88 y/o M PMHx significant for IBS, GERD, BPH, urinary retention, and idiopathic neuropathy presents to  from [facility] for further evaluation and management of a 4 day history of intermittent subjective fevers, malaise, shortness of breath, and dry cough. As noted the patient has a known notable recent history for (+) sick contacts at the facility. SaO2 on supplemental O2 w/ 2L/min nasal cannula-> 92%. In the ED the patient was found to be in AFib w/ RVR. Labs => WBC 10.64, Hgb/Hct 12.9/39.0, Fibrinogen 1279, INR 1.47, D-dimer 718, Na 134, BUN/Cr 28/1.42, Alb 2.6, , CRP 22.74, Ferritin 726, , CPK 1678, UA (+), COVID-19 (+). CXR => bilateral infiltrates. In the ED the patient was started on a Heparin gtt per protocol.

## 2020-04-12 NOTE — H&P ADULT - ASSESSMENT
88 y/o M PMHx significant for IBS, GERD, BPH, urinary retention, and idiopathic neuropathy presents to  from [facility] for further evaluation and management of a 4 day history of intermittent subjective fevers, malaise, shortness of breath, and dry cough. As noted the patient has a known notable recent history for (+) sick contacts at the facility. SaO2 on supplemental O2 w/ 2L/min nasal cannula-> 92%. In the ED the patient was found to be in AFib w/ RVR. Labs => WBC 10.64, Hgb/Hct 12.9/39.0, Fibrinogen 1279, INR 1.47, D-dimer 718, Na 134, BUN/Cr 28/1.42, Alb 2.6, , CRP 22.74, Ferritin 726, , CPK 1678, UA (+), COVID-19 (+). CXR => bilateral infiltrates. In the ED the patient was started on a Heparin gtt per protocol. 86 y/o M PMHx significant for IBS, GERD, BPH, urinary retention, and idiopathic neuropathy presents to  from [facility] for further evaluation and management of a 4 day history of intermittent subjective fevers, malaise, shortness of breath, and dry cough. As noted the patient has a known notable recent history for (+) sick contacts at the facility. SaO2 on supplemental O2 w/ 2L/min nasal cannula-> 92%. In the ED the patient was found to be in AFib w/ RVR. Labs => WBC 10.64, Hgb/Hct 12.9/39.0, Fibrinogen 1279, INR 1.47, D-dimer 718, Na 134, BUN/Cr 28/1.42, Alb 2.6, , CRP 22.74, Ferritin 726, , CPK 1678, UA (+), COVID-19 (+). CXR => bilateral infiltrates. In the ED the patient was started on a Heparin gtt per protocol.    #Viral Pneumonia secondary to Novel Coronavirus Infection  ~admit to Telemetry  ~maintain on airborne isolation.  ~cont. supplemental O2 as needed via nasal cannula and up-titrate as needed.   ~will hold on starting Hydroxychloroquine due to new onset Afib w/ RVR  ~cont. antipyretics w/ Acetaminophen 650 mg PO q4h PRN fever. Limit use of NSAIDs.  ~cont. HFA albuterol Q6 hour PRN via MDI. Would avoid nebulized preparations to limit risk of aerosol formation.    #Afib w/ RVR  ~serial Lisbet/EKGs  ~cont. Heparin gtt per protocol  ~f/u w/ Cardiology in the am  ~consider 2DECHO in the am  ~cont. rate control    #Goals of Care discussion  ~case discussed Carine Lowe (c) [phone] and Wilda Lowe (h) [phone]. The state that the patient did not want any heroic measures taken as of last week when he stated he would want to be intubated if necessary. I discussed the extent of the severity of the patient's condition at length with the patient and patient's daughter's whom share in the decision making. They are in discussion at the time of this note in determining this patient's overall code status.       #Vte ppx  ~cont. Heparin gtt per protocol 88 y/o M PMHx significant for IBS, GERD, BPH, urinary retention, and idiopathic neuropathy presents to  from [facility] for further evaluation and management of a 4 day history of intermittent subjective fevers, malaise, shortness of breath, and dry cough. As noted the patient has a known notable recent history for (+) sick contacts at the facility. SaO2 on supplemental O2 w/ 2L/min nasal cannula-> 92%. In the ED the patient was found to be in AFib w/ RVR. Labs => WBC 10.64, Hgb/Hct 12.9/39.0, Fibrinogen 1279, INR 1.47, D-dimer 718, Na 134, BUN/Cr 28/1.42, Alb 2.6, , CRP 22.74, Ferritin 726, , CPK 1678, UA (+), COVID-19 (+). CXR => bilateral infiltrates. In the ED the patient was started on a Heparin gtt per protocol.    #Viral Pneumonia secondary to Novel Coronavirus Infection  ~admit to Telemetry  ~maintain on airborne isolation.  ~cont. supplemental O2 as needed via nasal cannula and up-titrate as needed.   ~will hold on starting Hydroxychloroquine due to new onset Afib w/ RVR  ~cont. antipyretics w/ Acetaminophen 650 mg PO q4h PRN fever. Limit use of NSAIDs.  ~cont. HFA albuterol Q6 hour PRN via MDI. Would avoid nebulized preparations to limit risk of aerosol formation.    #Afib w/ RVR  ~serial Lisbet/EKGs  ~cont. Heparin gtt per protocol  ~f/u w/ Cardiology in the am  ~consider 2DECHO in the am  ~cont. rate control    #Goals of Care discussion  ~case discussed Carine Lowe (c) [phone] and Wilda Lowe (h) [phone]. The state that the patient did not want any heroic measures taken as of last week when he stated he would want to be intubated if necessary. I discussed the extent of the severity of the patient's condition at length with the patient and patient's daughter's whom share in the decision making. They are in discussion at the time of this note in determining this patient's overall code status.     #Vte ppx  ~cont. Heparin gtt per protocol

## 2020-04-12 NOTE — H&P ADULT - NSHPPHYSICALEXAM_GEN_ALL_CORE
Vital Signs Last 24 Hrs  T(C): 37.7 (12 Apr 2020 00:06), Max: 37.8 (11 Apr 2020 19:36)  T(F): 99.8 (12 Apr 2020 00:06), Max: 100 (11 Apr 2020 19:36)  HR: 105 (12 Apr 2020 00:06) (98 - 105)  BP: 122/63 (12 Apr 2020 00:06) (120/76 - 147/91)  RR: 18 (12 Apr 2020 00:06) (18 - 18)  SpO2: 96% (12 Apr 2020 00:06) (94% - 96%)

## 2020-04-12 NOTE — CHART NOTE - NSCHARTNOTEFT_GEN_A_CORE
Brief visit with patient and reviewed chart  Patient admitted this am  Spoke to daughter who is a pediatrician. Informed of her fathers status  Previously had intimated that he would not want aggressive measures taken, but at this time he states that he would want to be on a ventillator if he needed.  Patient with new onset atrial fibrillation. Daughter states that he never had  this before. Agreeable to telemetry and cardiac workup.  Troponins ordered  TFT's ordered  Continue IV Heparin Brief visit with patient and reviewed chart  Patient admitted this am  Spoke to daughter who is a pediatrician. Informed of her fathers status  Previously had intimated that he would not want aggressive measures taken, but at this time he states that he would want to be on a ventilator if he needed.  Patient with new onset atrial fibrillation. Daughter states that he never had  this before. Agreeable to telemetry and cardiac workup.  Troponins ordered  TFT's ordered  Continue IV Heparin  Add Plaquenil  Add Levaquin IV Brief visit with patient and reviewed chart  Patient admitted this am  Spoke to daughter who is a pediatrician. Informed of her fathers status  Previously had intimated that he would not want aggressive measures taken, but at this time he states that he would want to be on a ventilator if he needed.  Patient with new onset atrial fibrillation. Daughter states that he never had  this before. Agreeable to telemetry and cardiac workup.  Troponins ordered  TFT's ordered  Continue IV Heparin  Add Plaquenil - QTC <500  Add Levaquin IV  Add pravastin 80 mg

## 2020-04-12 NOTE — ED ADULT NURSE REASSESSMENT NOTE - NS ED NURSE REASSESS COMMENT FT1
spoke to md jensen and inquired about rocephine and plaquenil, md agreed to give meds to pt, awaiting for order from md gross

## 2020-04-13 DIAGNOSIS — R79.89 OTHER SPECIFIED ABNORMAL FINDINGS OF BLOOD CHEMISTRY: ICD-10-CM

## 2020-04-13 DIAGNOSIS — U07.1 COVID-19: ICD-10-CM

## 2020-04-13 DIAGNOSIS — I48.91 UNSPECIFIED ATRIAL FIBRILLATION: ICD-10-CM

## 2020-04-13 DIAGNOSIS — F03.90 UNSPECIFIED DEMENTIA WITHOUT BEHAVIORAL DISTURBANCE: ICD-10-CM

## 2020-04-13 DIAGNOSIS — N31.2 FLACCID NEUROPATHIC BLADDER, NOT ELSEWHERE CLASSIFIED: ICD-10-CM

## 2020-04-13 LAB
ADD ON TEST-SPECIMEN IN LAB: SIGNIFICANT CHANGE UP
APTT BLD: 75.1 SEC — HIGH (ref 27.5–36.3)
T3 SERPL-MCNC: 56 NG/DL — LOW (ref 80–200)
T4 AB SER-ACNC: 5.2 UG/DL — SIGNIFICANT CHANGE UP (ref 4.6–12)
TSH SERPL-MCNC: 4.55 UU/ML — SIGNIFICANT CHANGE UP (ref 0.34–4.82)

## 2020-04-13 PROCEDURE — 93306 TTE W/DOPPLER COMPLETE: CPT | Mod: 26,CS

## 2020-04-13 PROCEDURE — 99223 1ST HOSP IP/OBS HIGH 75: CPT | Mod: CS

## 2020-04-13 RX ORDER — SODIUM CHLORIDE 9 MG/ML
500 INJECTION INTRAMUSCULAR; INTRAVENOUS; SUBCUTANEOUS ONCE
Refills: 0 | Status: COMPLETED | OUTPATIENT
Start: 2020-04-13 | End: 2020-04-13

## 2020-04-13 RX ORDER — APIXABAN 2.5 MG/1
2.5 TABLET, FILM COATED ORAL EVERY 12 HOURS
Refills: 0 | Status: DISCONTINUED | OUTPATIENT
Start: 2020-04-13 | End: 2020-04-23

## 2020-04-13 RX ORDER — METOPROLOL TARTRATE 50 MG
12.5 TABLET ORAL EVERY 12 HOURS
Refills: 0 | Status: DISCONTINUED | OUTPATIENT
Start: 2020-04-13 | End: 2020-04-23

## 2020-04-13 RX ADMIN — APIXABAN 2.5 MILLIGRAM(S): 2.5 TABLET, FILM COATED ORAL at 10:01

## 2020-04-13 RX ADMIN — APIXABAN 2.5 MILLIGRAM(S): 2.5 TABLET, FILM COATED ORAL at 22:27

## 2020-04-13 RX ADMIN — SODIUM CHLORIDE 1000 MILLILITER(S): 9 INJECTION INTRAMUSCULAR; INTRAVENOUS; SUBCUTANEOUS at 06:29

## 2020-04-13 RX ADMIN — Medication 650 MILLIGRAM(S): at 14:31

## 2020-04-13 RX ADMIN — Medication 80 MILLIGRAM(S): at 22:27

## 2020-04-13 RX ADMIN — Medication 60 MILLIGRAM(S): at 14:30

## 2020-04-13 RX ADMIN — Medication 60 MILLIGRAM(S): at 22:27

## 2020-04-13 RX ADMIN — Medication 650 MILLIGRAM(S): at 01:38

## 2020-04-13 RX ADMIN — Medication 12.5 MILLIGRAM(S): at 12:09

## 2020-04-13 RX ADMIN — Medication 650 MILLIGRAM(S): at 22:51

## 2020-04-13 RX ADMIN — Medication 400 MILLIGRAM(S): at 12:09

## 2020-04-13 RX ADMIN — Medication 12.5 MILLIGRAM(S): at 22:28

## 2020-04-13 NOTE — CONSULT NOTE ADULT - SUBJECTIVE AND OBJECTIVE BOX
CARDIOLOGY CONSULT /HOSPITALIST COVERING DURING COVID       CHIEF COMPLAINT:    HPI:  88 y/o M PMHx significant for IBS, GERD, BPH, urinary retention, and idiopathic neuropathy presents to  from [facility] for further evaluation and management of a 4 day history of intermittent subjective fevers, malaise, shortness of breath, and dry cough. As noted the patient has a known notable recent history for (+) sick contacts at the facility. SaO2 on supplemental O2 w/ 2L/min nasal cannula-> 92%. In the ED the patient was found to be in AFib w/ RVR. Labs => WBC 10.64, Hgb/Hct 12.9/39.0, Fibrinogen 1279, INR 1.47, D-dimer 718, Na 134, BUN/Cr 28/1.42, Alb 2.6, , CRP 22.74, Ferritin 726, , CPK 1678, UA (+), COVID-19 (+). CXR => bilateral infiltrates. In the ED the patient was started on a Heparin gtt per protocol. (12 Apr 2020 04:47)      4/13/20  Chart reviewed , spoke to patient , and  daughter dr dent . Patient without any active cardiac condition , who was admitted with COVID PNEUMONIA  noted to have AFIB PAROXYSM WITH RAPID RATE , Given cardizem which improved his rhythm no recurrence of AFIB , patient is feeling better than when he came in today  , his blood work showed mild elevated troponin       PAST MEDICAL & SURGICAL HISTORY:  Neuropathy, idiopathic  BPH (benign prostatic hyperplasia)  H/O gastroesophageal reflux (GERD)  History of IBS  Urinary retention  No significant past surgical history      Allergies    penicillin (Unknown)    Intolerances        SOCIAL HISTORY: former smoker     FAMILY HISTORY:  No pertinent family history in first degree relatives      MEDICATIONS:  MEDICATIONS  (STANDING):  apixaban 2.5 milliGRAM(s) Oral every 12 hours  diltiazem    Tablet 60 milliGRAM(s) Oral every 8 hours  hydroxychloroquine   Oral   hydroxychloroquine 400 milliGRAM(s) Oral every 24 hours  levoFLOXacin IVPB 500 milliGRAM(s) IV Intermittent every 24 hours  levoFLOXacin IVPB      pravastatin 80 milliGRAM(s) Oral at bedtime    MEDICATIONS  (PRN):  acetaminophen   Tablet .. 650 milliGRAM(s) Oral every 6 hours PRN Mild Pain (1 - 3)  acetaminophen   Tablet .. 650 milliGRAM(s) Oral every 4 hours PRN Temp greater or equal to 38.5C (101.3F)  ALBUTerol    90 MICROgram(s) HFA Inhaler 2 Puff(s) Inhalation every 4 hours PRN Shortness of Breath and/or Wheezing  aluminum hydroxide/magnesium hydroxide/simethicone Suspension 30 milliLiter(s) Oral every 4 hours PRN Dyspepsia  benzonatate 100 milliGRAM(s) Oral three times a day PRN Cough      REVIEW OF SYSTEMS:    CONSTITUTIONAL: No weakness, fevers or chills  EYES/ENT: No visual changes;  No vertigo or throat pain   NECK: No pain or stiffness  RESPIRATORY: No cough, wheezing, hemoptysis; No shortness of breath  CARDIOVASCULAR: No chest pain or palpitations  GASTROINTESTINAL: No abdominal or epigastric pain. No nausea, vomiting, or hematemesis; No diarrhea or constipation. No melena or hematochezia.  GENITOURINARY: No dysuria, frequency or hematuria  NEUROLOGICAL: unsteady gait  forgetfulness   SKIN: No itching, burning, rashes, or lesions   All other review of systems is negative unless indicated above    Vital Signs Last 24 Hrs  T(C): 36.8 (13 Apr 2020 05:33), Max: 37 (12 Apr 2020 11:42)  T(F): 98.2 (13 Apr 2020 05:33), Max: 98.6 (12 Apr 2020 11:42)  HR: 69 (13 Apr 2020 05:33) (69 - 98)  BP: 95/48 (13 Apr 2020 06:30) (84/64 - 133/70)  BP(mean): --  RR: 14 (13 Apr 2020 05:33) (14 - 18)  SpO2: 93% (13 Apr 2020 05:33) (93% - 98%)    I&O's Summary    12 Apr 2020 07:01  -  13 Apr 2020 07:00  --------------------------------------------------------  IN: 170 mL / OUT: 350 mL / NET: -180 mL        PHYSICAL EXAM:    Constitutional: NAD, awake and alert, well-developed  HEENT: PERR, EOMI,  No oral cyananosis.  Neck:  supple,  No JVD  Respiratory: Breath sounds are clear bilaterally, No wheezing, rales or rhonchi  Cardiovascular: S1 and S2, regularly ectopics  gallops or rubs  Gastrointestinal: Bowel Sounds present, soft, nontender.   Extremities: No peripheral edema. No clubbing or cyanosis.  Vascular: 2+ peripheral pulses  Neurological: A/O x 3, forgetful   unsteady gait   Musculoskeletal: no calf tenderness.  Skin: No rashes.      LABS: All Labs Reviewed:                        12.0   9.65  )-----------( 200      ( 12 Apr 2020 05:45 )             35.6                         12.9   10.64 )-----------( 195      ( 11 Apr 2020 20:51 )             39.0     11 Apr 2020 20:51    134    |  102    |  28     ----------------------------<  123    4.9     |  26     |  1.42     Ca    8.5        11 Apr 2020 20:51    TPro  7.7    /  Alb  2.6    /  TBili  0.4    /  DBili  x      /  AST  128    /  ALT  67     /  AlkPhos  68     11 Apr 2020 20:51    PT/INR - ( 11 Apr 2020 20:51 )   PT: 16.5 sec;   INR: 1.47 ratio         PTT - ( 13 Apr 2020 08:24 )  PTT:75.1 sec  CARDIAC MARKERS ( 12 Apr 2020 13:52 )  0.499 ng/mL / x     / x     / x     / x      CARDIAC MARKERS ( 12 Apr 2020 11:06 )  0.478 ng/mL / x     / x     / x     / x      CARDIAC MARKERS ( 11 Apr 2020 20:51 )  x     / x     / 1678 U/L / x     / x          Blood Culture:     04-13 @ 08:24  TSH: 4.55      RADIOLOGY/EKG:< from: 12 Lead ECG (04.11.20 @ 19:47) >  Atrial fibrillation with rapid ventricular response  Nonspecific ST abnormality  Abnormal ECG  No previous ECGs available  Confirmed by GEETHA PABLO PAUL ([id number]) on 4/12/2020 9:05:01 AM    < end of copied text >      < from: Xray Chest 1 View AP/PA. (04.11.20 @ 21:15) >  IMPRESSION: Bilateral lung parenchymal airspace opacities, right greater than left.      < end of copied text >

## 2020-04-13 NOTE — CONSULT NOTE ADULT - PROBLEM SELECTOR RECOMMENDATION 4
with possible uro sepsis  will call urology for change in suprapubic catheter scheduled for this week

## 2020-04-13 NOTE — CONSULT NOTE ADULT - PROBLEM SELECTOR RECOMMENDATION 3
possible due to demand ischemia , afib with rapid rate , sepsis,  will add ecotrin , continue statin , add lopressor 12.5 mg po bID  , serail EKG as patient is on medication causing  prolonged QT

## 2020-04-13 NOTE — DIETITIAN INITIAL EVALUATION ADULT. - MALNUTRITION
Pt may meet criteria with further assessment. Will follow up with facilities about patients PO and weight h/x

## 2020-04-13 NOTE — CONSULT NOTE ADULT - ASSESSMENT
86 y/o M PMHx significant for IBS, GERD, BPH, urinary retention, and idiopathic neuropathy presents to  from [facility] for further evaluation and management of a 4 day history of intermittent subjective fevers, malaise, shortness of breath, and dry cough. As noted the patient has a known notable recent history for (+) sick contacts at the facility. SaO2 on supplemental O2 w/ 2L/min nasal cannula-> 92%. In the ED the patient was found to be in AFib w/ RVR. Labs => WBC 10.64, Hgb/Hct 12.9/39.0, Fibrinogen 1279, INR 1.47, D-dimer 718, Na 134, BUN/Cr 28/1.42, Alb 2.6, , CRP 22.74, Ferritin 726, , CPK 1678, UA (+), COVID-19 (+). CXR => bilateral infiltrates. In the ED the patient was started on a Heparin gtt per protocol.    #Viral Pneumonia secondary to Novel Coronavirus Infection  ~admit to Telemetry  ~maintain on airborne isolation.  ~cont. supplemental O2 as needed via nasal cannula and up-titrate as needed.   ~will hold on starting Hydroxychloroquine due to new onset Afib w/ RVR  ~cont. antipyretics w/ Acetaminophen 650 mg PO q4h PRN fever. Limit use of NSAIDs.  ~cont. HFA albuterol Q6 hour PRN via MDI. Would avoid nebulized preparations to limit risk of aerosol formation.    #Afib w/ RVR  ~serial Lisbet/EKGs  ~cont. Heparin gtt per protocol  ~f/u w/ Cardiology in the am  ~consider 2DECHO in the am  ~cont. rate control    #Goals of Care discussion  ~case discussed Carine Lowe (c) [phone] (h)[phone] and Wilda Lowe (h) [phone]. The state that the patient did not want any heroic measures taken as of last week when he stated he would want to be intubated if necessary. I discussed the extent of the severity of the patient's condition at length with the patient and patient's daughter's whom share in the decision making. They are in discussion at the time of this note in determining this patient's overall code status.     #Vte ppx

## 2020-04-13 NOTE — DIETITIAN INITIAL EVALUATION ADULT. - OTHER INFO
88 y/o M PMHx significant for IBS, GERD, BPH, urinary retention, and idiopathic neuropathy presents to  from [facility] for further evaluation and management of a 4 day history of intermittent subjective fevers, malaise, shortness of breath, and dry cough. As noted the patient has a known notable recent history for (+) sick contacts at the facility. SaO2 on supplemental O2 w/ 2L/min nasal cannula-> 92%. In the ED the patient was found to be in AFib w/ RVR. Labs => WBC 10.64, Hgb/Hct 12.9/39.0, Fibrinogen 1279, INR 1.47, D-dimer 718, Na 134, BUN/Cr 28/1.42, Alb 2.6, , CRP 22.74, Ferritin 726, , CPK 1678, UA (+), COVID-19 (+). CXR => bilateral infiltrates. In the ED the patient was started on a Heparin gtt per protocol.    Pt seen for low BMI. Attempted to contact patient over phone, but unable to reach. Will make further attempts to contact patient, family or assisted living facility. Reviewed pt's notes and appears patients appetite has not been good per jadon scale.  Pt's weight h/x varies greatly. Pt in 2018 was noted at 49kg, pt current weight suggests weight gain. Will contact [facility] to confirm, pt profile places her at nutiriotnal risk and would be unlikely to gain weight given advanced age. Recommend at this time Ensure Enlive BID. Will continue to monitor pt's nutiriotnal status. Encourage PO intake

## 2020-04-13 NOTE — DIETITIAN INITIAL EVALUATION ADULT. - PERTINENT MEDS FT
MEDICATIONS  (STANDING):  apixaban 2.5 milliGRAM(s) Oral every 12 hours  diltiazem    Tablet 60 milliGRAM(s) Oral every 8 hours  hydroxychloroquine   Oral   hydroxychloroquine 400 milliGRAM(s) Oral every 24 hours  levoFLOXacin IVPB 500 milliGRAM(s) IV Intermittent every 24 hours  levoFLOXacin IVPB      metoprolol tartrate 12.5 milliGRAM(s) Oral every 12 hours  pravastatin 80 milliGRAM(s) Oral at bedtime    MEDICATIONS  (PRN):  acetaminophen   Tablet .. 650 milliGRAM(s) Oral every 6 hours PRN Mild Pain (1 - 3)  acetaminophen   Tablet .. 650 milliGRAM(s) Oral every 4 hours PRN Temp greater or equal to 38.5C (101.3F)  ALBUTerol    90 MICROgram(s) HFA Inhaler 2 Puff(s) Inhalation every 4 hours PRN Shortness of Breath and/or Wheezing  aluminum hydroxide/magnesium hydroxide/simethicone Suspension 30 milliLiter(s) Oral every 4 hours PRN Dyspepsia  benzonatate 100 milliGRAM(s) Oral three times a day PRN Cough

## 2020-04-14 LAB
-  AMIKACIN: SIGNIFICANT CHANGE UP
-  AMPICILLIN/SULBACTAM: SIGNIFICANT CHANGE UP
-  AMPICILLIN: SIGNIFICANT CHANGE UP
-  AZTREONAM: SIGNIFICANT CHANGE UP
-  CEFAZOLIN: SIGNIFICANT CHANGE UP
-  CEFEPIME: SIGNIFICANT CHANGE UP
-  CEFOXITIN: SIGNIFICANT CHANGE UP
-  CEFTRIAXONE: SIGNIFICANT CHANGE UP
-  CIPROFLOXACIN: SIGNIFICANT CHANGE UP
-  GENTAMICIN: SIGNIFICANT CHANGE UP
-  LEVOFLOXACIN: SIGNIFICANT CHANGE UP
-  MEROPENEM: SIGNIFICANT CHANGE UP
-  NITROFURANTOIN: SIGNIFICANT CHANGE UP
-  PIPERACILLIN/TAZOBACTAM: SIGNIFICANT CHANGE UP
-  TOBRAMYCIN: SIGNIFICANT CHANGE UP
-  TRIMETHOPRIM/SULFAMETHOXAZOLE: SIGNIFICANT CHANGE UP
ALBUMIN SERPL ELPH-MCNC: 2.3 G/DL — LOW (ref 3.3–5)
ALP SERPL-CCNC: 68 U/L — SIGNIFICANT CHANGE UP (ref 40–120)
ALT FLD-CCNC: 51 U/L — SIGNIFICANT CHANGE UP (ref 12–78)
ANION GAP SERPL CALC-SCNC: 7 MMOL/L — SIGNIFICANT CHANGE UP (ref 5–17)
AST SERPL-CCNC: 56 U/L — HIGH (ref 15–37)
BASOPHILS # BLD AUTO: 0 K/UL — SIGNIFICANT CHANGE UP (ref 0–0.2)
BASOPHILS NFR BLD AUTO: 0 % — SIGNIFICANT CHANGE UP (ref 0–2)
BILIRUB SERPL-MCNC: 0.3 MG/DL — SIGNIFICANT CHANGE UP (ref 0.2–1.2)
BUN SERPL-MCNC: 50 MG/DL — HIGH (ref 7–23)
CALCIUM SERPL-MCNC: 8.8 MG/DL — SIGNIFICANT CHANGE UP (ref 8.5–10.1)
CHLORIDE SERPL-SCNC: 107 MMOL/L — SIGNIFICANT CHANGE UP (ref 96–108)
CO2 SERPL-SCNC: 26 MMOL/L — SIGNIFICANT CHANGE UP (ref 22–31)
CREAT SERPL-MCNC: 1.9 MG/DL — HIGH (ref 0.5–1.3)
CRP SERPL-MCNC: 22.97 MG/DL — HIGH (ref 0–0.4)
D DIMER BLD IA.RAPID-MCNC: 506 NG/ML DDU — HIGH
EOSINOPHIL # BLD AUTO: 0 K/UL — SIGNIFICANT CHANGE UP (ref 0–0.5)
EOSINOPHIL NFR BLD AUTO: 0 % — SIGNIFICANT CHANGE UP (ref 0–6)
FERRITIN SERPL-MCNC: 1675 NG/ML — HIGH (ref 30–400)
GLUCOSE SERPL-MCNC: 101 MG/DL — HIGH (ref 70–99)
HCT VFR BLD CALC: 39 % — SIGNIFICANT CHANGE UP (ref 39–50)
HGB BLD-MCNC: 13 G/DL — SIGNIFICANT CHANGE UP (ref 13–17)
LDH SERPL L TO P-CCNC: 487 U/L — HIGH (ref 84–241)
LYMPHOCYTES # BLD AUTO: 0.11 K/UL — LOW (ref 1–3.3)
LYMPHOCYTES # BLD AUTO: 1 % — LOW (ref 13–44)
MCHC RBC-ENTMCNC: 27.4 PG — SIGNIFICANT CHANGE UP (ref 27–34)
MCHC RBC-ENTMCNC: 33.3 GM/DL — SIGNIFICANT CHANGE UP (ref 32–36)
MCV RBC AUTO: 82.1 FL — SIGNIFICANT CHANGE UP (ref 80–100)
METHOD TYPE: SIGNIFICANT CHANGE UP
MONOCYTES # BLD AUTO: 0.8 K/UL — SIGNIFICANT CHANGE UP (ref 0–0.9)
MONOCYTES NFR BLD AUTO: 7 % — SIGNIFICANT CHANGE UP (ref 2–14)
NEUTROPHILS # BLD AUTO: 10.45 K/UL — HIGH (ref 1.8–7.4)
NEUTROPHILS NFR BLD AUTO: 89 % — HIGH (ref 43–77)
NRBC # BLD: SIGNIFICANT CHANGE UP /100 WBCS (ref 0–0)
PLATELET # BLD AUTO: 277 K/UL — SIGNIFICANT CHANGE UP (ref 150–400)
POTASSIUM SERPL-MCNC: 3.9 MMOL/L — SIGNIFICANT CHANGE UP (ref 3.5–5.3)
POTASSIUM SERPL-SCNC: 3.9 MMOL/L — SIGNIFICANT CHANGE UP (ref 3.5–5.3)
PROT SERPL-MCNC: 7.5 GM/DL — SIGNIFICANT CHANGE UP (ref 6–8.3)
RBC # BLD: 4.75 M/UL — SIGNIFICANT CHANGE UP (ref 4.2–5.8)
RBC # FLD: 14.1 % — SIGNIFICANT CHANGE UP (ref 10.3–14.5)
SODIUM SERPL-SCNC: 140 MMOL/L — SIGNIFICANT CHANGE UP (ref 135–145)
WBC # BLD: 11.36 K/UL — HIGH (ref 3.8–10.5)
WBC # FLD AUTO: 11.36 K/UL — HIGH (ref 3.8–10.5)

## 2020-04-14 PROCEDURE — 99233 SBSQ HOSP IP/OBS HIGH 50: CPT | Mod: CS

## 2020-04-14 RX ORDER — CEFTRIAXONE 500 MG/1
1000 INJECTION, POWDER, FOR SOLUTION INTRAMUSCULAR; INTRAVENOUS ONCE
Refills: 0 | Status: COMPLETED | OUTPATIENT
Start: 2020-04-14 | End: 2020-04-14

## 2020-04-14 RX ORDER — CEFTRIAXONE 500 MG/1
1 INJECTION, POWDER, FOR SOLUTION INTRAMUSCULAR; INTRAVENOUS EVERY 24 HOURS
Refills: 0 | Status: DISCONTINUED | OUTPATIENT
Start: 2020-04-14 | End: 2020-04-14

## 2020-04-14 RX ORDER — CEFTRIAXONE 500 MG/1
1000 INJECTION, POWDER, FOR SOLUTION INTRAMUSCULAR; INTRAVENOUS
Refills: 0 | Status: COMPLETED | OUTPATIENT
Start: 2020-04-15 | End: 2020-04-20

## 2020-04-14 RX ORDER — DILTIAZEM HCL 120 MG
180 CAPSULE, EXT RELEASE 24 HR ORAL DAILY
Refills: 0 | Status: DISCONTINUED | OUTPATIENT
Start: 2020-04-14 | End: 2020-04-23

## 2020-04-14 RX ORDER — SODIUM CHLORIDE 9 MG/ML
1000 INJECTION, SOLUTION INTRAVENOUS
Refills: 0 | Status: DISCONTINUED | OUTPATIENT
Start: 2020-04-14 | End: 2020-04-17

## 2020-04-14 RX ADMIN — Medication 80 MILLIGRAM(S): at 21:41

## 2020-04-14 RX ADMIN — Medication 12.5 MILLIGRAM(S): at 21:41

## 2020-04-14 RX ADMIN — Medication 400 MILLIGRAM(S): at 11:57

## 2020-04-14 RX ADMIN — APIXABAN 2.5 MILLIGRAM(S): 2.5 TABLET, FILM COATED ORAL at 21:41

## 2020-04-14 RX ADMIN — Medication 60 MILLIGRAM(S): at 05:39

## 2020-04-14 RX ADMIN — CEFTRIAXONE 100 MILLIGRAM(S): 500 INJECTION, POWDER, FOR SOLUTION INTRAMUSCULAR; INTRAVENOUS at 18:46

## 2020-04-14 RX ADMIN — Medication 180 MILLIGRAM(S): at 12:02

## 2020-04-14 RX ADMIN — Medication 12.5 MILLIGRAM(S): at 11:57

## 2020-04-14 RX ADMIN — APIXABAN 2.5 MILLIGRAM(S): 2.5 TABLET, FILM COATED ORAL at 11:57

## 2020-04-14 NOTE — PHARMACOTHERAPY INTERVENTION NOTE - COMMENTS
medrec complete. agree with medications entered as daughter Carine confirmed no prescription medications. med hx utilized
Urine culture resistant to fluroquiniolones - recommended switching to ceftriaxone; contacted patient's [facility] patient filled Ceftin in 01/2020 notified RN and will monitor
changed from 1 mg to 1000 mg

## 2020-04-14 NOTE — PROGRESS NOTE ADULT - SUBJECTIVE AND OBJECTIVE BOX
Called for SP tube change.   Catheter removed. Under sterile conditions, 18 Fr Berry placed through existing suprapubic tract. Good placement confirmed with drainage of yellow urine. Pt tolerated the procedure well.

## 2020-04-14 NOTE — PROGRESS NOTE ADULT - SUBJECTIVE AND OBJECTIVE BOX
CARDIOLOGY CONSULT /HOSPITALIST COVERING DURING COVID Pandemic       CHIEF COMPLAINT:    HPI:  88 y/o M PMHx significant for IBS, GERD, BPH, urinary retention, and idiopathic neuropathy presents to  from [facility] for further evaluation and management of a 4 day history of intermittent subjective fevers, malaise, shortness of breath, and dry cough. As noted the patient has a known notable recent history for (+) sick contacts at the facility. SaO2 on supplemental O2 w/ 2L/min nasal cannula-> 92%. In the ED the patient was found to be in AFib w/ RVR. Labs => WBC 10.64, Hgb/Hct 12.9/39.0, Fibrinogen 1279, INR 1.47, D-dimer 718, Na 134, BUN/Cr 28/1.42, Alb 2.6, , CRP 22.74, Ferritin 726, , CPK 1678, UA (+), COVID-19 (+). CXR => bilateral infiltrates. In the ED the patient was started on a Heparin gtt per protocol. (12 Apr 2020 04:47)      4/13/20  Chart reviewed , spoke to patient , and  daughter dr dent . Patient without any active cardiac condition , who was admitted with COVID PNEUMONIA  noted to have AFIB PAROXYSM WITH RAPID RATE , Given cardizem which improved his rhythm no recurrence of AFIB , patient is feeling better than when he came in today  , his blood work showed mild elevated troponin     4/14/20 Patient  had high fever yesterday , patient heart rate controlled in sinus rhythm  patient feeling better today , decreased appetite       PAST MEDICAL & SURGICAL HISTORY:  Neuropathy, idiopathic  BPH (benign prostatic hyperplasia)  H/O gastroesophageal reflux (GERD)  History of IBS  Urinary retention  No significant past surgical history    MEDICATIONS  (STANDING):  apixaban 2.5 milliGRAM(s) Oral every 12 hours  diltiazem    Tablet 60 milliGRAM(s) Oral every 8 hours  hydroxychloroquine   Oral   hydroxychloroquine 400 milliGRAM(s) Oral every 24 hours  levoFLOXacin IVPB 500 milliGRAM(s) IV Intermittent every 24 hours  levoFLOXacin IVPB      metoprolol tartrate 12.5 milliGRAM(s) Oral every 12 hours  pravastatin 80 milliGRAM(s) Oral at bedtime    MEDICATIONS  (PRN):  acetaminophen   Tablet .. 650 milliGRAM(s) Oral every 6 hours PRN Mild Pain (1 - 3)  acetaminophen   Tablet .. 650 milliGRAM(s) Oral every 4 hours PRN Temp greater or equal to 38.5C (101.3F)  ALBUTerol    90 MICROgram(s) HFA Inhaler 2 Puff(s) Inhalation every 4 hours PRN Shortness of Breath and/or Wheezing  aluminum hydroxide/magnesium hydroxide/simethicone Suspension 30 milliLiter(s) Oral every 4 hours PRN Dyspepsia  benzonatate 100 milliGRAM(s) Oral three times a day PRN Cough    Vital Signs Last 24 Hrs  T(C): 36.9 (14 Apr 2020 04:52), Max: 39.5 (13 Apr 2020 14:29)  T(F): 98.4 (14 Apr 2020 04:52), Max: 103.1 (13 Apr 2020 14:29)  HR: 70 (14 Apr 2020 04:52) (70 - 85)  BP: 104/62 (14 Apr 2020 04:52) (89/52 - 125/68)  BP(mean): --  RR: 16 (14 Apr 2020 04:52) (16 - 18)  SpO2: 92% (14 Apr 2020 04:52) (92% - 98%)    I&O's Summary    13 Apr 2020 07:01  -  14 Apr 2020 07:00  --------------------------------------------------------  IN: 715 mL / OUT: 0 mL / NET: 715 mL        PHYSICAL EXAM:    Constitutional: NAD, awake and alert, thin built  HEENT: PERR, EOMI,  No oral cyananosis.  Neck:  supple,  No JVD  Respiratory: Breath sounds are clear bilaterally, No wheezing, rales or rhonchi  Cardiovascular: S1 and S2, regularly ectopics  gallops or rubs  Gastrointestinal: Bowel Sounds present, soft, nontender.   Extremities: No peripheral edema. No clubbing or cyanosis.  Vascular: 2+ peripheral pulses  Neurological: A/O x 3, forgetful   unsteady gait   Musculoskeletal: no calf tenderness.  Skin: No rashes.      LABS: All Labs Reviewed:                                    13.0   11.36 )-----------( 277      ( 14 Apr 2020 08:19 )             39.0       Mg     2.4     04-13      CARDIAC MARKERS ( 12 Apr 2020 13:52 )  0.499 ng/mL / x     / x     / x     / x      CARDIAC MARKERS ( 12 Apr 2020 11:06 )  0.478 ng/mL / x     / x     / x     / x            PTT - ( 13 Apr 2020 08:24 )  PTT:75.1 sec      Culture Results:   10,000 - 49,000 CFU/mL Gram Negative Rods (04-12-20 @ 00:34)  Culture Results:   No growth to date. (04-11-20 @ 20:51)      RADIOLOGY/EKG:< from: 12 Lead ECG (04.11.20 @ 19:47) >  Atrial fibrillation with rapid ventricular response  Nonspecific ST abnormality  Abnormal ECG  No previous ECGs available  Confirmed by GEETHA PABLO PAUL ([id number]) on 4/12/2020 9:05:01 AM    < end of copied text >      < from: Xray Chest 1 View AP/PA. (04.11.20 @ 21:15) >  IMPRESSION: Bilateral lung parenchymal airspace opacities, right greater than left.      < end of copied text >      Monitor  sinus rhythm  brieft SVT normal QT     < from: TTE Echo Complete w/o contrast w/ Doppler (04.13.20 @ 12:01) >  mmary     Left ventricle systolic function appears preserved. EF=50%.   Mild concentric hypertrophy.   Mild eccentric aortic regurgitation is present.   Mild (1+) mitral regurgitation is present.     Signature     -------------------------------------------------------------    < end of copied text >

## 2020-04-14 NOTE — PROGRESS NOTE ADULT - PROBLEM SELECTOR PLAN 4
possible due to demand ischemia , afib with rapid rate , sepsis,  will add ecotrin , continue statin , add lopressor 12.5 mg po bID  , serail EKG as patient is on medication causing  prolonged QT.

## 2020-04-14 NOTE — PROGRESS NOTE ADULT - PROBLEM SELECTOR PLAN 1
in setting of COVID pneumonia , elderly ,  now in sinus rhythm on cardizem ,  low dose lopresor continue  him eliquis 2.5 mg po BID ,

## 2020-04-15 DIAGNOSIS — Z29.9 ENCOUNTER FOR PROPHYLACTIC MEASURES, UNSPECIFIED: ICD-10-CM

## 2020-04-15 LAB
-  AMPICILLIN: SIGNIFICANT CHANGE UP
-  CIPROFLOXACIN: SIGNIFICANT CHANGE UP
-  LEVOFLOXACIN: SIGNIFICANT CHANGE UP
-  NITROFURANTOIN: SIGNIFICANT CHANGE UP
-  TETRACYCLINE: SIGNIFICANT CHANGE UP
-  VANCOMYCIN: SIGNIFICANT CHANGE UP
ALBUMIN SERPL ELPH-MCNC: 2.1 G/DL — LOW (ref 3.3–5)
ALP SERPL-CCNC: 66 U/L — SIGNIFICANT CHANGE UP (ref 40–120)
ALT FLD-CCNC: 40 U/L — SIGNIFICANT CHANGE UP (ref 12–78)
ANION GAP SERPL CALC-SCNC: 6 MMOL/L — SIGNIFICANT CHANGE UP (ref 5–17)
AST SERPL-CCNC: 47 U/L — HIGH (ref 15–37)
BASOPHILS # BLD AUTO: 0.03 K/UL — SIGNIFICANT CHANGE UP (ref 0–0.2)
BASOPHILS NFR BLD AUTO: 0.2 % — SIGNIFICANT CHANGE UP (ref 0–2)
BILIRUB SERPL-MCNC: 0.2 MG/DL — SIGNIFICANT CHANGE UP (ref 0.2–1.2)
BUN SERPL-MCNC: 50 MG/DL — HIGH (ref 7–23)
CALCIUM SERPL-MCNC: 8.9 MG/DL — SIGNIFICANT CHANGE UP (ref 8.5–10.1)
CHLORIDE SERPL-SCNC: 112 MMOL/L — HIGH (ref 96–108)
CO2 SERPL-SCNC: 26 MMOL/L — SIGNIFICANT CHANGE UP (ref 22–31)
CREAT SERPL-MCNC: 1.57 MG/DL — HIGH (ref 0.5–1.3)
CRP SERPL-MCNC: 24.32 MG/DL — HIGH (ref 0–0.4)
CULTURE RESULTS: SIGNIFICANT CHANGE UP
EOSINOPHIL # BLD AUTO: 0 K/UL — SIGNIFICANT CHANGE UP (ref 0–0.5)
EOSINOPHIL NFR BLD AUTO: 0 % — SIGNIFICANT CHANGE UP (ref 0–6)
FERRITIN SERPL-MCNC: 2164 NG/ML — HIGH (ref 30–400)
GLUCOSE SERPL-MCNC: 130 MG/DL — HIGH (ref 70–99)
HCT VFR BLD CALC: 37.6 % — LOW (ref 39–50)
HGB BLD-MCNC: 12.4 G/DL — LOW (ref 13–17)
IMM GRANULOCYTES NFR BLD AUTO: 1.8 % — HIGH (ref 0–1.5)
LDH SERPL L TO P-CCNC: 451 U/L — HIGH (ref 84–241)
LYMPHOCYTES # BLD AUTO: 0.52 K/UL — LOW (ref 1–3.3)
LYMPHOCYTES # BLD AUTO: 4.1 % — LOW (ref 13–44)
MCHC RBC-ENTMCNC: 27.1 PG — SIGNIFICANT CHANGE UP (ref 27–34)
MCHC RBC-ENTMCNC: 33 GM/DL — SIGNIFICANT CHANGE UP (ref 32–36)
MCV RBC AUTO: 82.1 FL — SIGNIFICANT CHANGE UP (ref 80–100)
METHOD TYPE: SIGNIFICANT CHANGE UP
MONOCYTES # BLD AUTO: 0.86 K/UL — SIGNIFICANT CHANGE UP (ref 0–0.9)
MONOCYTES NFR BLD AUTO: 6.8 % — SIGNIFICANT CHANGE UP (ref 2–14)
NEUTROPHILS # BLD AUTO: 10.93 K/UL — HIGH (ref 1.8–7.4)
NEUTROPHILS NFR BLD AUTO: 87.1 % — HIGH (ref 43–77)
ORGANISM # SPEC MICROSCOPIC CNT: SIGNIFICANT CHANGE UP
PLATELET # BLD AUTO: 299 K/UL — SIGNIFICANT CHANGE UP (ref 150–400)
POTASSIUM SERPL-MCNC: 3.7 MMOL/L — SIGNIFICANT CHANGE UP (ref 3.5–5.3)
POTASSIUM SERPL-SCNC: 3.7 MMOL/L — SIGNIFICANT CHANGE UP (ref 3.5–5.3)
PROT SERPL-MCNC: 7 GM/DL — SIGNIFICANT CHANGE UP (ref 6–8.3)
RBC # BLD: 4.58 M/UL — SIGNIFICANT CHANGE UP (ref 4.2–5.8)
RBC # FLD: 14.3 % — SIGNIFICANT CHANGE UP (ref 10.3–14.5)
SODIUM SERPL-SCNC: 144 MMOL/L — SIGNIFICANT CHANGE UP (ref 135–145)
SPECIMEN SOURCE: SIGNIFICANT CHANGE UP
WBC # BLD: 12.57 K/UL — HIGH (ref 3.8–10.5)
WBC # FLD AUTO: 12.57 K/UL — HIGH (ref 3.8–10.5)

## 2020-04-15 PROCEDURE — 99233 SBSQ HOSP IP/OBS HIGH 50: CPT | Mod: CS

## 2020-04-15 RX ADMIN — Medication 400 MILLIGRAM(S): at 12:10

## 2020-04-15 RX ADMIN — Medication 12.5 MILLIGRAM(S): at 21:48

## 2020-04-15 RX ADMIN — Medication 180 MILLIGRAM(S): at 04:45

## 2020-04-15 RX ADMIN — Medication 80 MILLIGRAM(S): at 21:48

## 2020-04-15 RX ADMIN — APIXABAN 2.5 MILLIGRAM(S): 2.5 TABLET, FILM COATED ORAL at 16:10

## 2020-04-15 RX ADMIN — SODIUM CHLORIDE 75 MILLILITER(S): 9 INJECTION, SOLUTION INTRAVENOUS at 23:49

## 2020-04-15 RX ADMIN — APIXABAN 2.5 MILLIGRAM(S): 2.5 TABLET, FILM COATED ORAL at 04:44

## 2020-04-15 RX ADMIN — Medication 12.5 MILLIGRAM(S): at 12:10

## 2020-04-15 RX ADMIN — Medication 60 MILLIGRAM(S): at 16:08

## 2020-04-15 RX ADMIN — CEFTRIAXONE 1000 MILLIGRAM(S): 500 INJECTION, POWDER, FOR SOLUTION INTRAMUSCULAR; INTRAVENOUS at 16:09

## 2020-04-15 NOTE — PROGRESS NOTE ADULT - PROBLEM SELECTOR PLAN 3
Suprapubic cathter was changed this admission.  Received Levaquin on 4/12-13; now receiving ceftriaxone (4/14-now) for Proteus mirabilis UTI; afebrile.

## 2020-04-15 NOTE — PROGRESS NOTE ADULT - PROBLEM SELECTOR PLAN 1
Mr Lowe has COVID19 associated pneumonia; low SPO2 on supplemental O2 via N/C (seems to be breathing through his mouth) -- will change to NRB mask and again try to titrate down FIO2.  Hydroxychloroquine (day #4); continue pravastatin.

## 2020-04-15 NOTE — PROGRESS NOTE ADULT - SUBJECTIVE AND OBJECTIVE BOX
* Seen during COVID19 pandemic while assisting hospitalist service    HPI:  86 y/o man with a history of IBS, BPH, urinary retention requiring suprapubic catheter, and neuropathy who was transferred from his assisted living facility on 4/11/2020 and found to have COVID19 infection with associated pneumonia; found to be in atrial fibrillation with RVR in the ED with spontaneous return of sinus rhythm.    4/13/20  Chart reviewed , spoke to patient , and  daughter dr dent . Patient without any active cardiac condition , who was admitted with COVID PNEUMONIA  noted to have AFIB PAROXYSM WITH RAPID RATE , Given cardizem which improved his rhythm no recurrence of AFIB , patient is feeling better than when he came in today  , his blood work showed mild elevated troponin     4/14/20 Patient  had high fever yesterday , patient heart rate controlled in sinus rhythm  patient feeling better today , decreased appetite     4/15/20:  No new complaints; comfortable; denies dyspnea at rest; occasional cough, poor appetite.    MEDICATIONS  (STANDING):  apixaban 2.5 milliGRAM(s) Oral every 12 hours  cefTRIAXone Injectable. 1000 milliGRAM(s) IV Push <User Schedule>  dextrose 5% + sodium chloride 0.45%. 1000 milliLiter(s) (75 mL/Hr) IV Continuous <Continuous>  diltiazem    milliGRAM(s) Oral daily  hydroxychloroquine 400 milliGRAM(s) Oral every 24 hours  metoprolol tartrate 12.5 milliGRAM(s) Oral every 12 hours  pravastatin 80 milliGRAM(s) Oral at bedtime    MEDICATIONS  (PRN):  acetaminophen   Tablet .. 650 milliGRAM(s) Oral every 6 hours PRN Mild Pain (1 - 3)  acetaminophen   Tablet .. 650 milliGRAM(s) Oral every 4 hours PRN Temp greater or equal to 38.5C (101.3F)  ALBUTerol    90 MICROgram(s) HFA Inhaler 2 Puff(s) Inhalation every 4 hours PRN Shortness of Breath and/or Wheezing  aluminum hydroxide/magnesium hydroxide/simethicone Suspension 30 milliLiter(s) Oral every 4 hours PRN Dyspepsia  benzonatate 100 milliGRAM(s) Oral three times a day PRN Cough    Vital Signs Last 24 Hrs  T(C): 36.6 (15 Apr 2020 04:40), Max: 36.8 (14 Apr 2020 11:03)  T(F): 97.9 (15 Apr 2020 04:40), Max: 98.3 (14 Apr 2020 11:03)  HR: 83 (15 Apr 2020 04:40) (73 - 83)  BP: 118/79 (15 Apr 2020 04:40) (118/63 - 122/63)  RR: 18 (15 Apr 2020 04:40) (18 - 18)  SpO2: 94% (15 Apr 2020 04:40) (92% - 94%)    PHYSICAL EXAM:  Constitutional: Seated in bed, appears stated age, no distress, forgetful  HEENT: Wearing eyeglasses, No oral cyanosis  Respiratory: Nonlabored, deep inspiration causes him to cough, scattered rhonchi  Cardiovascular: Regular, normal S1 and S2  Gastrointestinal: Bowel Sounds present, soft, nontender.   Extremities: No leg edema.    LABS:              12.4   12.57 )-----------( 299      ( 15 Apr 2020 07:50 )             37.6     144  |  112<H>  |  50<H>  ----------------------------<  130<H>  3.7   |  26  |  1.57<H>    Ca    8.9      15 Apr 2020 07:50    TPro  7.0  /  Alb  2.1<L>  /  TBili  0.2  /  DBili  x   /  AST  47<H>  /  ALT  40  /  AlkPhos  66  04-15    TTE Echo Complete w/o contrast w/ Doppler (04.13.20 @ 12:01):   Left ventricle systolic function appears preserved. EF=50%.   Mild concentric hypertrophy.   Mild eccentric aortic regurgitation is present.   Mild (1+) mitral regurgitation is present.    Tele: Sinus rhythm

## 2020-04-16 LAB
ANION GAP SERPL CALC-SCNC: 6 MMOL/L — SIGNIFICANT CHANGE UP (ref 5–17)
BUN SERPL-MCNC: 44 MG/DL — HIGH (ref 7–23)
CALCIUM SERPL-MCNC: 8.7 MG/DL — SIGNIFICANT CHANGE UP (ref 8.5–10.1)
CHLORIDE SERPL-SCNC: 111 MMOL/L — HIGH (ref 96–108)
CO2 SERPL-SCNC: 23 MMOL/L — SIGNIFICANT CHANGE UP (ref 22–31)
CREAT SERPL-MCNC: 1.4 MG/DL — HIGH (ref 0.5–1.3)
D DIMER BLD IA.RAPID-MCNC: 591 NG/ML DDU — HIGH
FERRITIN SERPL-MCNC: 2753 NG/ML — HIGH (ref 30–400)
GLUCOSE SERPL-MCNC: 149 MG/DL — HIGH (ref 70–99)
HCT VFR BLD CALC: 36.6 % — LOW (ref 39–50)
HGB BLD-MCNC: 12.4 G/DL — LOW (ref 13–17)
MCHC RBC-ENTMCNC: 27.8 PG — SIGNIFICANT CHANGE UP (ref 27–34)
MCHC RBC-ENTMCNC: 33.9 GM/DL — SIGNIFICANT CHANGE UP (ref 32–36)
MCV RBC AUTO: 82.1 FL — SIGNIFICANT CHANGE UP (ref 80–100)
PLATELET # BLD AUTO: 334 K/UL — SIGNIFICANT CHANGE UP (ref 150–400)
POTASSIUM SERPL-MCNC: 3.7 MMOL/L — SIGNIFICANT CHANGE UP (ref 3.5–5.3)
POTASSIUM SERPL-SCNC: 3.7 MMOL/L — SIGNIFICANT CHANGE UP (ref 3.5–5.3)
PROCALCITONIN SERPL-MCNC: 0.31 NG/ML — HIGH (ref 0.02–0.1)
RBC # BLD: 4.46 M/UL — SIGNIFICANT CHANGE UP (ref 4.2–5.8)
RBC # FLD: 14.4 % — SIGNIFICANT CHANGE UP (ref 10.3–14.5)
SODIUM SERPL-SCNC: 140 MMOL/L — SIGNIFICANT CHANGE UP (ref 135–145)
WBC # BLD: 15.13 K/UL — HIGH (ref 3.8–10.5)
WBC # FLD AUTO: 15.13 K/UL — HIGH (ref 3.8–10.5)

## 2020-04-16 PROCEDURE — 99233 SBSQ HOSP IP/OBS HIGH 50: CPT | Mod: CS

## 2020-04-16 RX ADMIN — Medication 12.5 MILLIGRAM(S): at 11:59

## 2020-04-16 RX ADMIN — APIXABAN 2.5 MILLIGRAM(S): 2.5 TABLET, FILM COATED ORAL at 17:53

## 2020-04-16 RX ADMIN — Medication 180 MILLIGRAM(S): at 05:39

## 2020-04-16 RX ADMIN — CEFTRIAXONE 1000 MILLIGRAM(S): 500 INJECTION, POWDER, FOR SOLUTION INTRAMUSCULAR; INTRAVENOUS at 17:53

## 2020-04-16 RX ADMIN — Medication 80 MILLIGRAM(S): at 23:00

## 2020-04-16 RX ADMIN — APIXABAN 2.5 MILLIGRAM(S): 2.5 TABLET, FILM COATED ORAL at 05:39

## 2020-04-16 RX ADMIN — Medication 60 MILLIGRAM(S): at 05:39

## 2020-04-16 RX ADMIN — Medication 400 MILLIGRAM(S): at 11:59

## 2020-04-16 RX ADMIN — Medication 12.5 MILLIGRAM(S): at 23:45

## 2020-04-16 NOTE — PHYSICAL THERAPY INITIAL EVALUATION ADULT - GENERAL OBSERVATIONS, REHAB EVAL
Pt found supine in bed with O2 NRB, tele monitor, and bed alarm activated. PAINAD-4/10. Pt also with bilateral wrist restraints. Pt having difficulty following commands. Pt found supine in bed with O2 NRB, marley, tele monitor, and bed alarm activated. PAINAD-4/10. Pt also with bilateral wrist restraints. Pt having difficulty following commands.

## 2020-04-16 NOTE — PROGRESS NOTE ADULT - PROBLEM SELECTOR PLAN 1
COVID19 associated pneumonia -- stable and without fever; great SPO2 with NRB -- difficulty tapering to N/C because he seems to breath through his mouth and is hypoxic without supplemental O2;  Hydroxychloroquine (day #5); continue pravastatin, Solu-Medrol.

## 2020-04-16 NOTE — PROGRESS NOTE ADULT - SUBJECTIVE AND OBJECTIVE BOX
* Seen during COVID19 pandemic while assisting hospitalist service    HPI:  88 y/o man with a history of IBS, BPH, urinary retention requiring suprapubic catheter, and neuropathy who was transferred from his assisted living facility on 4/11/2020 and found to have COVID19 infection with associated pneumonia; found to be in atrial fibrillation with RVR in the ED with spontaneous return of sinus rhythm.    4/13/20  Chart reviewed , spoke to patient , and  daughter dr dent . Patient without any active cardiac condition , who was admitted with COVID PNEUMONIA  noted to have AFIB PAROXYSM WITH RAPID RATE , Given cardizem which improved his rhythm no recurrence of AFIB , patient is feeling better than when he came in today  , his blood work showed mild elevated troponin     4/14/20 Patient  had high fever yesterday , patient heart rate controlled in sinus rhythm  patient feeling better today , decreased appetite     4/15/20:  No new complaints; comfortable; denies dyspnea at rest; occasional cough, poor appetite.    4/16/20:  Mild dyspnea and cough; pulling at mask (with subsequent desaturation) requiring wrist restraints.    MEDICATIONS  (STANDING):  apixaban 2.5 milliGRAM(s) Oral every 12 hours  cefTRIAXone Injectable. 1000 milliGRAM(s) IV Push <User Schedule>  dextrose 5% + sodium chloride 0.45%. 1000 milliLiter(s) (75 mL/Hr) IV Continuous <Continuous>  diltiazem    milliGRAM(s) Oral daily  hydroxychloroquine 400 milliGRAM(s) Oral every 24 hours  methylPREDNISolone sodium succinate Injectable 60 milliGRAM(s) IV Push daily  metoprolol tartrate 12.5 milliGRAM(s) Oral every 12 hours  pravastatin 80 milliGRAM(s) Oral at bedtime    MEDICATIONS  (PRN):  acetaminophen   Tablet .. 650 milliGRAM(s) Oral every 6 hours PRN Mild Pain (1 - 3)  acetaminophen   Tablet .. 650 milliGRAM(s) Oral every 4 hours PRN Temp greater or equal to 38.5C (101.3F)  ALBUTerol    90 MICROgram(s) HFA Inhaler 2 Puff(s) Inhalation every 4 hours PRN Shortness of Breath and/or Wheezing  aluminum hydroxide/magnesium hydroxide/simethicone Suspension 30 milliLiter(s) Oral every 4 hours PRN Dyspepsia  benzonatate 100 milliGRAM(s) Oral three times a day PRN Cough    Vital Signs Last 24 Hrs  T(C): 36.4 (16 Apr 2020 04:32), Max: 36.8 (15 Apr 2020 16:31)  T(F): 97.5 (16 Apr 2020 04:32), Max: 98.3 (15 Apr 2020 16:31)  HR: 73 (16 Apr 2020 04:32) (71 - 87)  BP: 126/71 (16 Apr 2020 04:32) (115/64 - 128/60)  RR: 16 (16 Apr 2020 04:32) (16 - 23)  SpO2: 95% (16 Apr 2020 04:32) (83% - 99%)    PHYSICAL EXAM:  Constitutional: Semirecumbent in bed, appears stated age, no distress  HEENT: Wearing eyeglasses, No oral cyanosis; mouth open  Respiratory: Nonlabored, deep inspiration results in cough, scattered rhonchi  Cardiovascular: Regular, normal S1 and S2  Gastrointestinal: Bowel Sounds present, soft, nontender.   Extremities: No leg edema.    LABS:                 12.4   15.13 )-----------( 334      ( 16 Apr 2020 08:12 )             36.6     140  |  111<H>  |  44<H>  ----------------------------<  149<H>  3.7   |  23  |  1.40<H>    TTE Echo Complete w/o contrast w/ Doppler (04.13.20 @ 12:01):   Left ventricle systolic function appears preserved. EF=50%.   Mild concentric hypertrophy.   Mild eccentric aortic regurgitation is present.   Mild (1+) mitral regurgitation is present.    Tele: Sinus rhythm

## 2020-04-16 NOTE — PROGRESS NOTE ADULT - PROBLEM SELECTOR PLAN 3
Suprapubic cathter was changed this admission.  Received Levaquin (resistant) on 4/12-13; now receiving ceftriaxone (Day # 3) for Proteus mirabilis UTI; afebrile.

## 2020-04-17 DIAGNOSIS — Z71.89 OTHER SPECIFIED COUNSELING: ICD-10-CM

## 2020-04-17 LAB
CULTURE RESULTS: SIGNIFICANT CHANGE UP
SPECIMEN SOURCE: SIGNIFICANT CHANGE UP

## 2020-04-17 PROCEDURE — 99233 SBSQ HOSP IP/OBS HIGH 50: CPT | Mod: CS

## 2020-04-17 PROCEDURE — 71045 X-RAY EXAM CHEST 1 VIEW: CPT | Mod: 26

## 2020-04-17 RX ORDER — FUROSEMIDE 40 MG
20 TABLET ORAL ONCE
Refills: 0 | Status: COMPLETED | OUTPATIENT
Start: 2020-04-17 | End: 2020-04-17

## 2020-04-17 RX ADMIN — Medication 60 MILLIGRAM(S): at 10:30

## 2020-04-17 RX ADMIN — Medication 180 MILLIGRAM(S): at 10:29

## 2020-04-17 RX ADMIN — CEFTRIAXONE 1000 MILLIGRAM(S): 500 INJECTION, POWDER, FOR SOLUTION INTRAMUSCULAR; INTRAVENOUS at 17:43

## 2020-04-17 RX ADMIN — Medication 20 MILLIGRAM(S): at 11:38

## 2020-04-17 RX ADMIN — Medication 0.5 MILLIGRAM(S): at 11:39

## 2020-04-17 RX ADMIN — APIXABAN 2.5 MILLIGRAM(S): 2.5 TABLET, FILM COATED ORAL at 10:28

## 2020-04-17 RX ADMIN — APIXABAN 2.5 MILLIGRAM(S): 2.5 TABLET, FILM COATED ORAL at 17:42

## 2020-04-17 RX ADMIN — Medication 12.5 MILLIGRAM(S): at 22:24

## 2020-04-17 RX ADMIN — Medication 12.5 MILLIGRAM(S): at 11:39

## 2020-04-17 RX ADMIN — Medication 80 MILLIGRAM(S): at 22:24

## 2020-04-17 NOTE — PROGRESS NOTE ADULT - PROBLEM SELECTOR PLAN 3
Suprapubic cathter was changed this admission.  Received Levaquin (resistant) on 4/12-13; now receiving ceftriaxone (Day # 4) for Proteus mirabilis UTI; afebrile.

## 2020-04-17 NOTE — PROGRESS NOTE ADULT - PROBLEM SELECTOR PLAN 6
I had conversations with 2 of his daughters (Wilda - a pediatrician, and Carine) today (4/17) and we discussed severity of illness and lack of clinical improvement with worsening inflammatory markers.  Wilda reports 2 recent discussions (1 at his assisted living facility and again in the  ER) in which Mr Lowe said he would want to ventilatory assistance if required.

## 2020-04-17 NOTE — PROGRESS NOTE ADULT - PROBLEM SELECTOR PLAN 1
COVID19 associated pneumonia -- no significant improvement with rising inflammatory markers; Solu-Medrol day #3; satisfactory SPO2 with NRB but unable to taper; completed hydroxychloroquine (4/17); continue pravastatin.

## 2020-04-17 NOTE — PROGRESS NOTE ADULT - SUBJECTIVE AND OBJECTIVE BOX
* Seen during COVID19 pandemic while assisting hospitalist service    HPI:  86 y/o man with a history of IBS, BPH, urinary retention requiring suprapubic catheter, and neuropathy who was transferred from his assisted living facility on 4/11/2020 and found to have COVID19 infection with associated pneumonia; found to be in atrial fibrillation with RVR in the ED with spontaneous return of sinus rhythm.    4/13/20  Chart reviewed , spoke to patient , and  daughter dr dent . Patient without any active cardiac condition , who was admitted with COVID PNEUMONIA  noted to have AFIB PAROXYSM WITH RAPID RATE , Given cardizem which improved his rhythm no recurrence of AFIB , patient is feeling better than when he came in today  , his blood work showed mild elevated troponin     4/14/20 Patient  had high fever yesterday , patient heart rate controlled in sinus rhythm  patient feeling better today , decreased appetite     4/15/20:  No new complaints; comfortable; denies dyspnea at rest; occasional cough, poor appetite.    4/16/20:  Mild dyspnea and cough; pulling at mask (with subsequent desaturation) requiring wrist restraints.    4/17/2020:  No new complaints remains confused; agitated and pulling at tubes;.    MEDICATIONS  (STANDING):  apixaban 2.5 milliGRAM(s) Oral every 12 hours  cefTRIAXone Injectable. 1000 milliGRAM(s) IV Push <User Schedule>  diltiazem    milliGRAM(s) Oral daily  methylPREDNISolone sodium succinate Injectable 60 milliGRAM(s) IV Push daily  metoprolol tartrate 12.5 milliGRAM(s) Oral every 12 hours  pravastatin 80 milliGRAM(s) Oral at bedtime    MEDICATIONS  (PRN):  acetaminophen   Tablet .. 650 milliGRAM(s) Oral every 6 hours PRN Mild Pain (1 - 3)  acetaminophen   Tablet .. 650 milliGRAM(s) Oral every 4 hours PRN Temp greater or equal to 38.5C (101.3F)  ALBUTerol    90 MICROgram(s) HFA Inhaler 2 Puff(s) Inhalation every 4 hours PRN Shortness of Breath and/or Wheezing  aluminum hydroxide/magnesium hydroxide/simethicone Suspension 30 milliLiter(s) Oral every 4 hours PRN Dyspepsia  benzonatate 100 milliGRAM(s) Oral three times a day PRN Cough    Vital Signs Last 24 Hrs  T(C): 36.6 (17 Apr 2020 05:52), Max: 36.9 (16 Apr 2020 16:57)  T(F): 97.8 (17 Apr 2020 05:52), Max: 98.5 (16 Apr 2020 16:57)  HR: 74 (17 Apr 2020 05:52) (74 - 79)  BP: 131/78 (17 Apr 2020 05:52) (118/84 - 131/78)  RR: 19 (17 Apr 2020 05:52) (18 - 20)  SpO2: 96% (17 Apr 2020 05:52) (93% - 98%)    PHYSICAL EXAM:  Constitutional: Semirecumbent in bed, appears stated age, confused  HEENT: + oral cyanosis when he removes supplemental O2; mouth open  Respiratory: Nonlabored, deep inspiration results in cough, scattered rhonchi + bibasilar crackles  Cardiovascular: Regular, normal S1 and S2  Gastrointestinal: Bowel Sounds present, soft, nontender.   Extremities: No leg edema.    LABS:                 12.4   15.13 )-----------( 334      ( 16 Apr 2020 08:12 )             36.6     140  |  111<H>  |  44<H>  ----------------------------<  149<H>  3.7   |  23  |  1.40<H>    TTE Echo Complete w/o contrast w/ Doppler (04.13.20 @ 12:01):   Left ventricle systolic function appears preserved. EF=50%.   Mild concentric hypertrophy.   Mild eccentric aortic regurgitation is present.   Mild (1+) mitral regurgitation is present.    Tele: Sinus rhythm

## 2020-04-18 LAB
ALBUMIN SERPL ELPH-MCNC: 2.2 G/DL — LOW (ref 3.3–5)
ALP SERPL-CCNC: 79 U/L — SIGNIFICANT CHANGE UP (ref 40–120)
ALT FLD-CCNC: 43 U/L — SIGNIFICANT CHANGE UP (ref 12–78)
ANION GAP SERPL CALC-SCNC: 6 MMOL/L — SIGNIFICANT CHANGE UP (ref 5–17)
AST SERPL-CCNC: 56 U/L — HIGH (ref 15–37)
BASOPHILS # BLD AUTO: 0 K/UL — SIGNIFICANT CHANGE UP (ref 0–0.2)
BASOPHILS NFR BLD AUTO: 0 % — SIGNIFICANT CHANGE UP (ref 0–2)
BILIRUB SERPL-MCNC: 0.4 MG/DL — SIGNIFICANT CHANGE UP (ref 0.2–1.2)
BUN SERPL-MCNC: 56 MG/DL — HIGH (ref 7–23)
CALCIUM SERPL-MCNC: 8.8 MG/DL — SIGNIFICANT CHANGE UP (ref 8.5–10.1)
CHLORIDE SERPL-SCNC: 116 MMOL/L — HIGH (ref 96–108)
CO2 SERPL-SCNC: 25 MMOL/L — SIGNIFICANT CHANGE UP (ref 22–31)
CREAT SERPL-MCNC: 1.47 MG/DL — HIGH (ref 0.5–1.3)
CRP SERPL-MCNC: 5.2 MG/DL — HIGH (ref 0–0.4)
EOSINOPHIL # BLD AUTO: 0 K/UL — SIGNIFICANT CHANGE UP (ref 0–0.5)
EOSINOPHIL NFR BLD AUTO: 0 % — SIGNIFICANT CHANGE UP (ref 0–6)
FERRITIN SERPL-MCNC: 1299 NG/ML — HIGH (ref 30–400)
GLUCOSE SERPL-MCNC: 134 MG/DL — HIGH (ref 70–99)
HCT VFR BLD CALC: 39.4 % — SIGNIFICANT CHANGE UP (ref 39–50)
HGB BLD-MCNC: 13.1 G/DL — SIGNIFICANT CHANGE UP (ref 13–17)
LYMPHOCYTES # BLD AUTO: 1.48 K/UL — SIGNIFICANT CHANGE UP (ref 1–3.3)
LYMPHOCYTES # BLD AUTO: 6 % — LOW (ref 13–44)
MCHC RBC-ENTMCNC: 27.3 PG — SIGNIFICANT CHANGE UP (ref 27–34)
MCHC RBC-ENTMCNC: 33.2 GM/DL — SIGNIFICANT CHANGE UP (ref 32–36)
MCV RBC AUTO: 82.1 FL — SIGNIFICANT CHANGE UP (ref 80–100)
MONOCYTES # BLD AUTO: 0.25 K/UL — SIGNIFICANT CHANGE UP (ref 0–0.9)
MONOCYTES NFR BLD AUTO: 1 % — LOW (ref 2–14)
NEUTROPHILS # BLD AUTO: 23.01 K/UL — HIGH (ref 1.8–7.4)
NEUTROPHILS NFR BLD AUTO: 90 % — HIGH (ref 43–77)
NRBC # BLD: SIGNIFICANT CHANGE UP /100 WBCS (ref 0–0)
PLATELET # BLD AUTO: 511 K/UL — HIGH (ref 150–400)
POTASSIUM SERPL-MCNC: 3.8 MMOL/L — SIGNIFICANT CHANGE UP (ref 3.5–5.3)
POTASSIUM SERPL-SCNC: 3.8 MMOL/L — SIGNIFICANT CHANGE UP (ref 3.5–5.3)
PROT SERPL-MCNC: 7.1 GM/DL — SIGNIFICANT CHANGE UP (ref 6–8.3)
RBC # BLD: 4.8 M/UL — SIGNIFICANT CHANGE UP (ref 4.2–5.8)
RBC # FLD: 14.6 % — HIGH (ref 10.3–14.5)
SODIUM SERPL-SCNC: 147 MMOL/L — HIGH (ref 135–145)
WBC # BLD: 24.74 K/UL — HIGH (ref 3.8–10.5)
WBC # FLD AUTO: 24.74 K/UL — HIGH (ref 3.8–10.5)

## 2020-04-18 PROCEDURE — 99233 SBSQ HOSP IP/OBS HIGH 50: CPT | Mod: CS

## 2020-04-18 RX ADMIN — Medication 0.5 MILLIGRAM(S): at 21:14

## 2020-04-18 RX ADMIN — Medication 60 MILLIGRAM(S): at 07:01

## 2020-04-18 RX ADMIN — Medication 80 MILLIGRAM(S): at 21:14

## 2020-04-18 RX ADMIN — CEFTRIAXONE 1000 MILLIGRAM(S): 500 INJECTION, POWDER, FOR SOLUTION INTRAMUSCULAR; INTRAVENOUS at 17:32

## 2020-04-18 RX ADMIN — Medication 0.5 MILLIGRAM(S): at 02:36

## 2020-04-18 RX ADMIN — Medication 12.5 MILLIGRAM(S): at 07:01

## 2020-04-18 RX ADMIN — APIXABAN 2.5 MILLIGRAM(S): 2.5 TABLET, FILM COATED ORAL at 07:01

## 2020-04-18 RX ADMIN — Medication 12.5 MILLIGRAM(S): at 17:32

## 2020-04-18 RX ADMIN — APIXABAN 2.5 MILLIGRAM(S): 2.5 TABLET, FILM COATED ORAL at 17:32

## 2020-04-18 RX ADMIN — Medication 180 MILLIGRAM(S): at 07:01

## 2020-04-18 RX ADMIN — Medication 0.5 MILLIGRAM(S): at 11:25

## 2020-04-18 NOTE — PROGRESS NOTE ADULT - PROBLEM SELECTOR PLAN 3
Suprapubic cathter was changed this admission.  Received Levaquin (resistant) on 4/12-13; now receiving ceftriaxone (Day # 5) for Proteus mirabilis UTI; afebrile; WBC likely rising due to initiation of steroids.

## 2020-04-18 NOTE — PROGRESS NOTE ADULT - SUBJECTIVE AND OBJECTIVE BOX
* Seen during COVID19 pandemic while assisting hospitalist service    HPI:  86 y/o man with a history of IBS, BPH, urinary retention requiring suprapubic catheter, and neuropathy who was transferred from his assisted living facility on 4/11/2020 and found to have COVID19 infection with associated pneumonia; found to be in atrial fibrillation with RVR in the ED with spontaneous return of sinus rhythm.    4/13/20  Chart reviewed , spoke to patient , and  daughter dr dent . Patient without any active cardiac condition , who was admitted with COVID PNEUMONIA  noted to have AFIB PAROXYSM WITH RAPID RATE , Given cardizem which improved his rhythm no recurrence of AFIB , patient is feeling better than when he came in today  , his blood work showed mild elevated troponin     4/14/20 Patient  had high fever yesterday , patient heart rate controlled in sinus rhythm  patient feeling better today , decreased appetite     4/15/20:  No new complaints; comfortable; denies dyspnea at rest; occasional cough, poor appetite.    4/16/20:  Mild dyspnea and cough; pulling at mask (with subsequent desaturation) requiring wrist restraints.    4/17/2020:  No new complaints remains confused; agitated and pulling at tubes.    4/18/2020:  No complaints offered/elicited; confused; less agitated at time of encounter.    MEDICATIONS  (STANDING):  apixaban 2.5 milliGRAM(s) Oral every 12 hours  cefTRIAXone Injectable. 1000 milliGRAM(s) IV Push <User Schedule>  diltiazem    milliGRAM(s) Oral daily  methylPREDNISolone sodium succinate Injectable 60 milliGRAM(s) IV Push daily  metoprolol tartrate 12.5 milliGRAM(s) Oral every 12 hours  pravastatin 80 milliGRAM(s) Oral at bedtime    Vital Signs Last 24 Hrs  T(C): 36.4 (18 Apr 2020 07:02), Max: 36.4 (17 Apr 2020 10:28)  T(F): 97.6 (18 Apr 2020 07:02), Max: 97.6 (17 Apr 2020 10:28)  HR: 91 (18 Apr 2020 07:02) (73 - 91)  BP: 114/90 (18 Apr 2020 07:02) (114/90 - 129/60)  RR: 21 (18 Apr 2020 07:02) (21 - 23)  SpO2: 97% (18 Apr 2020 07:02) (91% - 97%)    PHYSICAL EXAM:  Constitutional: Seated in bed, appears stated age, no distress  HEENT: Mouth open  Respiratory: Nonlabored, scattered rhonchi, supplemental O2 via NRB mask  Cardiovascular: Regular, normal S1 and S2  Gastrointestinal: Bowel Sounds present, abdomen is soft, nontender.   Extremities: No leg edema.    LABS:                 12.4   15.13 )-----------( 334      ( 16 Apr 2020 08:12 )             36.6     140  |  111<H>  |  44<H>  ----------------------------<  149<H>  3.7   |  23  |  1.40<H>    TTE Echo Complete w/o contrast w/ Doppler (04.13.20 @ 12:01):   Left ventricle systolic function appears preserved. EF=50%.   Mild concentric hypertrophy.   Mild eccentric aortic regurgitation is present.   Mild (1+) mitral regurgitation is present.    Tele: Sinus rhythm

## 2020-04-18 NOTE — PROGRESS NOTE ADULT - PROBLEM SELECTOR PLAN 1
COVID19 associated pneumonia -- satisfactory SPO2 but continues to require NRB mask; inflammatory markers not improving, CXR worse; Solu-Medrol day #4;  completed hydroxychloroquine (4/17); continue pravastatin.

## 2020-04-18 NOTE — PROGRESS NOTE ADULT - PROBLEM SELECTOR PLAN 6
On 4/17/2020 I had conversations with 2 of his daughters (Wilda - a pediatrician, and Carine) and we discussed severity of illness and lack of clinical improvement with worsening inflammatory markers.  Wilda reports 2 recent discussions (1 at his assisted living facility and again in the  ER) in which Mr Lowe said he would want to ventilatory assistance if required.  Anticipate requesting assistance from palliative care team if continued lack of improvement.

## 2020-04-19 DIAGNOSIS — E87.0 HYPEROSMOLALITY AND HYPERNATREMIA: ICD-10-CM

## 2020-04-19 LAB
ALBUMIN SERPL ELPH-MCNC: 2.3 G/DL — LOW (ref 3.3–5)
ALP SERPL-CCNC: 76 U/L — SIGNIFICANT CHANGE UP (ref 40–120)
ALT FLD-CCNC: 46 U/L — SIGNIFICANT CHANGE UP (ref 12–78)
ANION GAP SERPL CALC-SCNC: 7 MMOL/L — SIGNIFICANT CHANGE UP (ref 5–17)
AST SERPL-CCNC: 63 U/L — HIGH (ref 15–37)
BASOPHILS # BLD AUTO: 0.07 K/UL — SIGNIFICANT CHANGE UP (ref 0–0.2)
BASOPHILS NFR BLD AUTO: 0.3 % — SIGNIFICANT CHANGE UP (ref 0–2)
BILIRUB SERPL-MCNC: 0.4 MG/DL — SIGNIFICANT CHANGE UP (ref 0.2–1.2)
BUN SERPL-MCNC: 63 MG/DL — HIGH (ref 7–23)
CALCIUM SERPL-MCNC: 8.9 MG/DL — SIGNIFICANT CHANGE UP (ref 8.5–10.1)
CHLORIDE SERPL-SCNC: 122 MMOL/L — HIGH (ref 96–108)
CO2 SERPL-SCNC: 26 MMOL/L — SIGNIFICANT CHANGE UP (ref 22–31)
CREAT SERPL-MCNC: 1.44 MG/DL — HIGH (ref 0.5–1.3)
CRP SERPL-MCNC: 3.37 MG/DL — HIGH (ref 0–0.4)
D DIMER BLD IA.RAPID-MCNC: 502 NG/ML DDU — HIGH
EOSINOPHIL # BLD AUTO: 0 K/UL — SIGNIFICANT CHANGE UP (ref 0–0.5)
EOSINOPHIL NFR BLD AUTO: 0 % — SIGNIFICANT CHANGE UP (ref 0–6)
FERRITIN SERPL-MCNC: 808 NG/ML — HIGH (ref 30–400)
GLUCOSE SERPL-MCNC: 144 MG/DL — HIGH (ref 70–99)
HCT VFR BLD CALC: 41.9 % — SIGNIFICANT CHANGE UP (ref 39–50)
HGB BLD-MCNC: 13.4 G/DL — SIGNIFICANT CHANGE UP (ref 13–17)
IMM GRANULOCYTES NFR BLD AUTO: 2.3 % — HIGH (ref 0–1.5)
LYMPHOCYTES # BLD AUTO: 0.43 K/UL — LOW (ref 1–3.3)
LYMPHOCYTES # BLD AUTO: 1.6 % — LOW (ref 13–44)
MCHC RBC-ENTMCNC: 27.3 PG — SIGNIFICANT CHANGE UP (ref 27–34)
MCHC RBC-ENTMCNC: 32 GM/DL — SIGNIFICANT CHANGE UP (ref 32–36)
MCV RBC AUTO: 85.3 FL — SIGNIFICANT CHANGE UP (ref 80–100)
MONOCYTES # BLD AUTO: 0.85 K/UL — SIGNIFICANT CHANGE UP (ref 0–0.9)
MONOCYTES NFR BLD AUTO: 3.2 % — SIGNIFICANT CHANGE UP (ref 2–14)
NEUTROPHILS # BLD AUTO: 24.78 K/UL — HIGH (ref 1.8–7.4)
NEUTROPHILS NFR BLD AUTO: 92.6 % — HIGH (ref 43–77)
PLATELET # BLD AUTO: 524 K/UL — HIGH (ref 150–400)
POTASSIUM SERPL-MCNC: 3.2 MMOL/L — LOW (ref 3.5–5.3)
POTASSIUM SERPL-SCNC: 3.2 MMOL/L — LOW (ref 3.5–5.3)
PROT SERPL-MCNC: 6.9 GM/DL — SIGNIFICANT CHANGE UP (ref 6–8.3)
RBC # BLD: 4.91 M/UL — SIGNIFICANT CHANGE UP (ref 4.2–5.8)
RBC # FLD: 15.2 % — HIGH (ref 10.3–14.5)
SODIUM SERPL-SCNC: 155 MMOL/L — HIGH (ref 135–145)
WBC # BLD: 26.74 K/UL — HIGH (ref 3.8–10.5)
WBC # FLD AUTO: 26.74 K/UL — HIGH (ref 3.8–10.5)

## 2020-04-19 PROCEDURE — 99233 SBSQ HOSP IP/OBS HIGH 50: CPT | Mod: CS

## 2020-04-19 RX ORDER — SODIUM CHLORIDE 9 MG/ML
1000 INJECTION, SOLUTION INTRAVENOUS
Refills: 0 | Status: DISCONTINUED | OUTPATIENT
Start: 2020-04-19 | End: 2020-04-21

## 2020-04-19 RX ORDER — HALOPERIDOL DECANOATE 100 MG/ML
0.5 INJECTION INTRAMUSCULAR EVERY 6 HOURS
Refills: 0 | Status: DISCONTINUED | OUTPATIENT
Start: 2020-04-19 | End: 2020-04-23

## 2020-04-19 RX ORDER — QUETIAPINE FUMARATE 200 MG/1
12.5 TABLET, FILM COATED ORAL AT BEDTIME
Refills: 0 | Status: DISCONTINUED | OUTPATIENT
Start: 2020-04-19 | End: 2020-04-23

## 2020-04-19 RX ADMIN — Medication 12.5 MILLIGRAM(S): at 05:44

## 2020-04-19 RX ADMIN — Medication 60 MILLIGRAM(S): at 05:44

## 2020-04-19 RX ADMIN — Medication 12.5 MILLIGRAM(S): at 17:46

## 2020-04-19 RX ADMIN — CEFTRIAXONE 1000 MILLIGRAM(S): 500 INJECTION, POWDER, FOR SOLUTION INTRAMUSCULAR; INTRAVENOUS at 17:46

## 2020-04-19 RX ADMIN — HALOPERIDOL DECANOATE 0.5 MILLIGRAM(S): 100 INJECTION INTRAMUSCULAR at 20:51

## 2020-04-19 RX ADMIN — APIXABAN 2.5 MILLIGRAM(S): 2.5 TABLET, FILM COATED ORAL at 17:47

## 2020-04-19 RX ADMIN — APIXABAN 2.5 MILLIGRAM(S): 2.5 TABLET, FILM COATED ORAL at 05:44

## 2020-04-19 RX ADMIN — SODIUM CHLORIDE 60 MILLILITER(S): 9 INJECTION, SOLUTION INTRAVENOUS at 11:50

## 2020-04-19 RX ADMIN — HALOPERIDOL DECANOATE 0.5 MILLIGRAM(S): 100 INJECTION INTRAMUSCULAR at 11:49

## 2020-04-19 RX ADMIN — QUETIAPINE FUMARATE 12.5 MILLIGRAM(S): 200 TABLET, FILM COATED ORAL at 20:51

## 2020-04-19 RX ADMIN — Medication 80 MILLIGRAM(S): at 20:51

## 2020-04-19 RX ADMIN — Medication 180 MILLIGRAM(S): at 05:44

## 2020-04-19 NOTE — PROGRESS NOTE ADULT - SUBJECTIVE AND OBJECTIVE BOX
* Seen during COVID19 pandemic while assisting hospitalist service    HPI:  86 y/o man with a history of IBS, BPH, urinary retention requiring suprapubic catheter, and neuropathy who was transferred from his assisted living facility on 4/11/2020 and found to have COVID19 infection with associated pneumonia; found to be in atrial fibrillation with RVR in the ED with spontaneous return of sinus rhythm.    4/13/20  Chart reviewed , spoke to patient , and  daughter dr dent . Patient without any active cardiac condition , who was admitted with COVID PNEUMONIA  noted to have AFIB PAROXYSM WITH RAPID RATE , Given cardizem which improved his rhythm no recurrence of AFIB , patient is feeling better than when he came in today  , his blood work showed mild elevated troponin   4/14/20 Patient  had high fever yesterday , patient heart rate controlled in sinus rhythm  patient feeling better today , decreased appetite   4/15/20:  No new complaints; comfortable; denies dyspnea at rest; occasional cough, poor appetite.  4/16/20:  Mild dyspnea and cough; pulling at mask (with subsequent desaturation) requiring wrist restraints.  4/17/2020:  No new complaints remains confused; agitated and pulling at tubes.  4/18/2020:  No complaints offered/elicited; confused; less agitated at time of encounter.  4/19/2020:  Patient says he doesn't feel good but no specific complaints offered; denies pain; mild dyspnea, minimal cough, admits to being confused.    MEDICATIONS  (STANDING):  apixaban 2.5 milliGRAM(s) Oral every 12 hours  cefTRIAXone Injectable. 1000 milliGRAM(s) IV Push <User Schedule>  diltiazem    milliGRAM(s) Oral daily  methylPREDNISolone sodium succinate Injectable 60 milliGRAM(s) IV Push daily  metoprolol tartrate 12.5 milliGRAM(s) Oral every 12 hours  pravastatin 80 milliGRAM(s) Oral at bedtime    MEDICATIONS  (PRN):  acetaminophen   Tablet .. 650 milliGRAM(s) Oral every 6 hours PRN Mild Pain (1 - 3)  acetaminophen   Tablet .. 650 milliGRAM(s) Oral every 4 hours PRN Temp greater or equal to 38.5C (101.3F)  ALBUTerol    90 MICROgram(s) HFA Inhaler 2 Puff(s) Inhalation every 4 hours PRN Shortness of Breath and/or Wheezing  aluminum hydroxide/magnesium hydroxide/simethicone Suspension 30 milliLiter(s) Oral every 4 hours PRN Dyspepsia  benzonatate 100 milliGRAM(s) Oral three times a day PRN Cough  LORazepam     Tablet 0.5 milliGRAM(s) Oral every 12 hours PRN agitaition and pulling at mask despite wrist restraints    Vital Signs Last 24 Hrs  T(C): 36.6 (19 Apr 2020 04:49), Max: 36.7 (18 Apr 2020 12:04)  T(F): 97.8 (19 Apr 2020 04:49), Max: 98 (18 Apr 2020 12:04)  HR: 89 (19 Apr 2020 04:49) (78 - 89)  BP: 149/77 (19 Apr 2020 04:49) (130/77 - 149/77)  RR: 20 (19 Apr 2020 04:49) (18 - 20)  SpO2: 91% (19 Apr 2020 04:49) (91% - 93%)    PHYSICAL EXAM:  Constitutional: Semi-upright in bed, appears stated age and frail  HEENT: Mouth open  Respiratory: Nonlabored, scattered rhonchi, supplemental O2 via NRB mask  Cardiovascular: Regular, normal S1 and S2  Gastrointestinal: Bowel Sounds present, abdomen is soft, nontender.   Extremities: No leg edema.    LABS:                    13.1   24.74 )-----------( 511      ( 18 Apr 2020 08:58 )             39.4     147<H>  |  116<H>  |  56<H>  ----------------------------<  134<H>  3.8   |  25  |  1.47<H>    TTE Echo Complete w/o contrast w/ Doppler (04.13.20 @ 12:01):   Left ventricle systolic function appears preserved. EF=50%.   Mild concentric hypertrophy.   Mild eccentric aortic regurgitation is present.   Mild (1+) mitral regurgitation is present.    Tele: Sinus rhythm

## 2020-04-19 NOTE — PROGRESS NOTE ADULT - PROBLEM SELECTOR PLAN 4
Forgetful / confused. Suprapubic cathter was changed this admission.  Received Levaquin (resistant) on 4/12-13; now receiving ceftriaxone (Day # 6) for Proteus mirabilis UTI; afebrile; WBC likely rising due to initiation of steroids (last day today).

## 2020-04-19 NOTE — PROGRESS NOTE ADULT - PROBLEM SELECTOR PLAN 2
No recurrence of AF (remains in sinus rhythm); continue Cardizem, Metoprolol, Eliquis. Poor PO intake; start IVF (D5W); serial labs

## 2020-04-19 NOTE — PROGRESS NOTE ADULT - PROBLEM SELECTOR PLAN 3
Suprapubic cathter was changed this admission.  Received Levaquin (resistant) on 4/12-13; now receiving ceftriaxone (Day # 6) for Proteus mirabilis UTI; afebrile; WBC likely rising due to initiation of steroids (last day today). No recurrence of AF (remains in sinus rhythm); continue Cardizem, Metoprolol, Eliquis.

## 2020-04-19 NOTE — PROGRESS NOTE ADULT - PROBLEM SELECTOR PLAN 1
COVID19 associated pneumonia -- satisfactory SPO2 with NRB mask (15L/min) - continue daily attempts to decrease FIO2 (no success yesterday); inflammatory markers improved (but CXR worse); Solu-Medrol day #5;  completed hydroxychloroquine (4/17); continue pravastatin.

## 2020-04-19 NOTE — PROGRESS NOTE ADULT - PROBLEM SELECTOR PLAN 6
On 4/17/2020 I had conversations with 2 of his daughters (Wilda - a pediatrician, and Carine) and we discussed severity of illness and lack of clinical improvement with worsening inflammatory markers.  Wilda reports 2 recent discussions (1 at his assisted living facility and again in the  ER) in which Mr Lowe said he would want to ventilatory assistance if required.  Anticipate requesting assistance from palliative care team if continued lack of improvement. Receiving Eliquis for DVT prophylaxis given the presence of AF.

## 2020-04-20 LAB
ALBUMIN SERPL ELPH-MCNC: 2.2 G/DL — LOW (ref 3.3–5)
ALP SERPL-CCNC: 74 U/L — SIGNIFICANT CHANGE UP (ref 40–120)
ALT FLD-CCNC: 45 U/L — SIGNIFICANT CHANGE UP (ref 12–78)
ANION GAP SERPL CALC-SCNC: 7 MMOL/L — SIGNIFICANT CHANGE UP (ref 5–17)
AST SERPL-CCNC: 56 U/L — HIGH (ref 15–37)
BASOPHILS # BLD AUTO: 0.06 K/UL — SIGNIFICANT CHANGE UP (ref 0–0.2)
BASOPHILS NFR BLD AUTO: 0.2 % — SIGNIFICANT CHANGE UP (ref 0–2)
BILIRUB SERPL-MCNC: 0.4 MG/DL — SIGNIFICANT CHANGE UP (ref 0.2–1.2)
BUN SERPL-MCNC: 70 MG/DL — HIGH (ref 7–23)
CALCIUM SERPL-MCNC: 8.4 MG/DL — LOW (ref 8.5–10.1)
CHLORIDE SERPL-SCNC: 121 MMOL/L — HIGH (ref 96–108)
CO2 SERPL-SCNC: 24 MMOL/L — SIGNIFICANT CHANGE UP (ref 22–31)
CREAT SERPL-MCNC: 1.64 MG/DL — HIGH (ref 0.5–1.3)
EOSINOPHIL # BLD AUTO: 0 K/UL — SIGNIFICANT CHANGE UP (ref 0–0.5)
EOSINOPHIL NFR BLD AUTO: 0 % — SIGNIFICANT CHANGE UP (ref 0–6)
GLUCOSE SERPL-MCNC: 147 MG/DL — HIGH (ref 70–99)
HCT VFR BLD CALC: 38.8 % — LOW (ref 39–50)
HGB BLD-MCNC: 12.6 G/DL — LOW (ref 13–17)
IMM GRANULOCYTES NFR BLD AUTO: 3.4 % — HIGH (ref 0–1.5)
LYMPHOCYTES # BLD AUTO: 0.69 K/UL — LOW (ref 1–3.3)
LYMPHOCYTES # BLD AUTO: 2.6 % — LOW (ref 13–44)
MCHC RBC-ENTMCNC: 27.2 PG — SIGNIFICANT CHANGE UP (ref 27–34)
MCHC RBC-ENTMCNC: 32.5 GM/DL — SIGNIFICANT CHANGE UP (ref 32–36)
MCV RBC AUTO: 83.6 FL — SIGNIFICANT CHANGE UP (ref 80–100)
MONOCYTES # BLD AUTO: 1.13 K/UL — HIGH (ref 0–0.9)
MONOCYTES NFR BLD AUTO: 4.2 % — SIGNIFICANT CHANGE UP (ref 2–14)
NEUTROPHILS # BLD AUTO: 24.07 K/UL — HIGH (ref 1.8–7.4)
NEUTROPHILS NFR BLD AUTO: 89.6 % — HIGH (ref 43–77)
PLATELET # BLD AUTO: 524 K/UL — HIGH (ref 150–400)
POTASSIUM SERPL-MCNC: 3.5 MMOL/L — SIGNIFICANT CHANGE UP (ref 3.5–5.3)
POTASSIUM SERPL-SCNC: 3.5 MMOL/L — SIGNIFICANT CHANGE UP (ref 3.5–5.3)
PROT SERPL-MCNC: 6.6 GM/DL — SIGNIFICANT CHANGE UP (ref 6–8.3)
RBC # BLD: 4.64 M/UL — SIGNIFICANT CHANGE UP (ref 4.2–5.8)
RBC # FLD: 15.1 % — HIGH (ref 10.3–14.5)
SODIUM SERPL-SCNC: 152 MMOL/L — HIGH (ref 135–145)
WBC # BLD: 26.85 K/UL — HIGH (ref 3.8–10.5)
WBC # FLD AUTO: 26.85 K/UL — HIGH (ref 3.8–10.5)

## 2020-04-20 PROCEDURE — 99223 1ST HOSP IP/OBS HIGH 75: CPT

## 2020-04-20 PROCEDURE — 99233 SBSQ HOSP IP/OBS HIGH 50: CPT | Mod: CS

## 2020-04-20 RX ORDER — HYDROMORPHONE HYDROCHLORIDE 2 MG/ML
0.25 INJECTION INTRAMUSCULAR; INTRAVENOUS; SUBCUTANEOUS EVERY 4 HOURS
Refills: 0 | Status: DISCONTINUED | OUTPATIENT
Start: 2020-04-20 | End: 2020-04-23

## 2020-04-20 RX ORDER — HALOPERIDOL DECANOATE 100 MG/ML
0.5 INJECTION INTRAMUSCULAR EVERY 6 HOURS
Refills: 0 | Status: DISCONTINUED | OUTPATIENT
Start: 2020-04-20 | End: 2020-04-23

## 2020-04-20 RX ADMIN — HALOPERIDOL DECANOATE 0.5 MILLIGRAM(S): 100 INJECTION INTRAMUSCULAR at 12:56

## 2020-04-20 RX ADMIN — CEFTRIAXONE 1000 MILLIGRAM(S): 500 INJECTION, POWDER, FOR SOLUTION INTRAMUSCULAR; INTRAVENOUS at 18:12

## 2020-04-20 RX ADMIN — Medication 180 MILLIGRAM(S): at 05:40

## 2020-04-20 RX ADMIN — Medication 12.5 MILLIGRAM(S): at 05:40

## 2020-04-20 RX ADMIN — HYDROMORPHONE HYDROCHLORIDE 0.25 MILLIGRAM(S): 2 INJECTION INTRAMUSCULAR; INTRAVENOUS; SUBCUTANEOUS at 23:27

## 2020-04-20 RX ADMIN — APIXABAN 2.5 MILLIGRAM(S): 2.5 TABLET, FILM COATED ORAL at 05:40

## 2020-04-20 RX ADMIN — SODIUM CHLORIDE 60 MILLILITER(S): 9 INJECTION, SOLUTION INTRAVENOUS at 06:31

## 2020-04-20 NOTE — SWALLOW BEDSIDE ASSESSMENT ADULT - SLP GENERAL OBSERVATIONS
The pt was frail/deconditioned in appearance. A cervical kyphosis was noted. A NRB O2 mask was in place. The pt was arousable but fatigued, internally distractible, restless and variably oppositional. Unable to direct to structured communication tasks but pt did often moan/yell without clear intent. He also periodically spontaneously verbalized without a shania primary speech-language pathology but his verbalizations were typically brief/variably contextually inappropriate consistent with Cognitive Dysfunction(encephalopathy, Dementia).

## 2020-04-20 NOTE — PROGRESS NOTE ADULT - PROBLEM SELECTOR PLAN 4
Suprapubic cathter was changed and on ceftriaxone (Day # 7) for Proteus mirabilis UTI; afebrile; WBC high likely from steroids.

## 2020-04-20 NOTE — SWALLOW BEDSIDE ASSESSMENT ADULT - COMMENTS
The pt was admitted to  with fever and cough. Hospital course is notable for COVID-19 with associated pneumonia, rapid A-Fib, elevated inflammatory markers and encephalopathy. He has an underlying history of Dementia, IBS, GERD, BPH, neuropathy, and BPH warranting a suprapubic catheter, The patient was admitted to  with fever and cough. Hospital course is notable for COVID-19 with associated pneumonia, rapid A-Fib, elevated inflammatory markers and encephalopathy. He has an underlying history of Dementia, IBS, GERD, BPH, neuropathy, and BPH warranting a suprapubic catheter,

## 2020-04-20 NOTE — CONSULT NOTE ADULT - ASSESSMENT
Pt is a 87y old Male with hx of IBS, GERD, BPH, urinary retention requiring suprapubic cathete, and idiopathic neuropathy presents to  from [facility]  on 4/11/2020 for further evaluation and management of a 4 day history of intermittent subjective fevers, malaise, shortness of breath, and dry cough. As noted the patient has a known notable recent history for (+) sick contacts at the facility, found to have COVID19 infection with associated pneumonia and to be in atrial fibrillation with RVR now in NSR.    1) Acute Respiratory failure with Hypoxia    - Viral Pneumonia   - Novel Coronavirus Infection  - airborne isolation.  - cont. supplemental O2 on NRB   - not a candidate for Hydroxychloroquine due to new onset Afib w/ RVR    2) Afib w/ RVR  - continue to monitor on telemetry   - c/w Eliquis    - Cardiology consult appreciated   - cont. rate control    3) Atonic bladder.    - Suprapubic cathter was changed   - c/w Ceftriaxone  - continue to monitor   - ID consult appreciated     4) Severe Protein calorie malnutrition  - Speech and swallow eval appreciated   -  supportive care     5) Delirium/agitation    - Haldol 0.5mg PO q6hrs for agitation   - Seroquel 12.5 PO at bedtime   - will add haldol 0.5 mg IV if unable to tolerate PO     6) Advance Care planning   - Wife is HCP but deferred decisions making to daughter who is alternate  - GOC held 4/20/2020 daughter Wilda Lowe will discuss GOC with her sisters and call us back with their decisions explained that patients prognosis is guarded family is aware and would like some time to discuss it together.

## 2020-04-20 NOTE — SWALLOW BEDSIDE ASSESSMENT ADULT - SWALLOW EVAL: RECOMMENDED DIET
SUGGEST A DYSPHAGIA 1 DIET WITH HONEY THICK LIQUIDS AS THIS IS THE LEAST RESTRICTIVE TOLERABLE DIET CONSISTENCIES WHEN THE PT IS ALERT/COGNIZANT ENOUGH TO BE FED WHICH IS NOT ALWAYS THE CASE. FEED ONLY WHEN PT IS IN AN ALERT STATE.

## 2020-04-20 NOTE — CONSULT NOTE ADULT - SUBJECTIVE AND OBJECTIVE BOX
HPI: Pt is a 87y old Male with hx of IBS, GERD, BPH, urinary retention requiring suprapubic cathete, and idiopathic neuropathy presents to  from [facility]  on 4/11/2020 for further evaluation and management of a 4 day history of intermittent subjective fevers, malaise, shortness of breath, and dry cough. As noted the patient has a known notable recent history for (+) sick contacts at the facility, found to have COVID19 infection with associated pneumonia and to be in atrial fibrillation with RVR now in NSR.    4/20/2020 patient seen and examined at bedside with no family present. patient extremely agitated trying to climb out of bed and yelling, patient has NRB in place with wrist restraints to prevent patient from pulling off mask or pulling on suprapubic catheter. Patient is completly disoriented not able to answer any questions or follow any commands. Spoke with patients daughter Dr. Wilda Barnes who is aware that her dad prognosis is guarded, she stated that before this her mother and father lived together in [facility], her mother who is also presumed positive for Covid is recovering at home.     PAIN: ( )Yes   (x )No  DYSPNEA: (x ) Yes  ( ) No  Level: moderate - severe - NRB in place    PAST MEDICAL & SURGICAL HISTORY:  Neuropathy, idiopathic  BPH (benign prostatic hyperplasia)  H/O gastroesophageal reflux (GERD)  History of IBS  Urinary retention  No significant past surgical history      SOCIAL HX:    Hx opiate tolerance ( )YES  (x )NO    Baseline ADLs  (Prior to Admission)  (x ) Independent   ( )Dependent  with some assistance     FAMILY HISTORY:  No pertinent family history in first degree relatives - unable to assess       Review of Systems:    Unable to obtain/Limited due to: unable to assess       PHYSICAL EXAM:    Vital Signs Last 24 Hrs  T(C): 36.6 (20 Apr 2020 09:51), Max: 36.7 (20 Apr 2020 05:22)  T(F): 97.9 (20 Apr 2020 09:51), Max: 98.1 (20 Apr 2020 05:22)  HR: 90 (20 Apr 2020 09:51) (90 - 99)  BP: 101/79 (20 Apr 2020 09:51) (101/79 - 147/78)  RR: 22 (20 Apr 2020 09:51) (22 - 22)  SpO2: 91% (20 Apr 2020 09:51) (91% - 91%)    PPSV2: 30  %    General: ill appearing elderly gentleman in bed, very agitated   Mental Status: alert but completely disoriented   HEENT: dry oral mucosa + temporal wasting   Lungs: diminished b/l breath sounds, NRB in place   Cardiac: S1S2 +   GI: non distend, soft, + BS   : suprapubic catheter   Ext: no edema present   Neuro: unable to assess       LABS:                        12.6   26.85 )-----------( 524      ( 20 Apr 2020 08:29 )             38.8     04-20    152<H>  |  121<H>  |  70<H>  ----------------------------<  147<H>  3.5   |  24  |  1.64<H>    Ca    8.4<L>      20 Apr 2020 08:29    TPro  6.6  /  Alb  2.2<L>  /  TBili  0.4  /  DBili  x   /  AST  56<H>  /  ALT  45  /  AlkPhos  74  04-20      Albumin: Albumin, Serum: 2.2 g/dL (04-20 @ 08:29)      Allergies    penicillin (Unknown)    Intolerances      MEDICATIONS  (STANDING):  apixaban 2.5 milliGRAM(s) Oral every 12 hours  cefTRIAXone Injectable. 1000 milliGRAM(s) IV Push <User Schedule>  dextrose 5% 1000 milliLiter(s) (60 mL/Hr) IV Continuous <Continuous>  diltiazem    milliGRAM(s) Oral daily  metoprolol tartrate 12.5 milliGRAM(s) Oral every 12 hours  pravastatin 80 milliGRAM(s) Oral at bedtime  QUEtiapine 12.5 milliGRAM(s) Oral at bedtime    MEDICATIONS  (PRN):  acetaminophen   Tablet .. 650 milliGRAM(s) Oral every 6 hours PRN Mild Pain (1 - 3)  acetaminophen   Tablet .. 650 milliGRAM(s) Oral every 4 hours PRN Temp greater or equal to 38.5C (101.3F)  ALBUTerol    90 MICROgram(s) HFA Inhaler 2 Puff(s) Inhalation every 4 hours PRN Shortness of Breath and/or Wheezing  aluminum hydroxide/magnesium hydroxide/simethicone Suspension 30 milliLiter(s) Oral every 4 hours PRN Dyspepsia  benzonatate 100 milliGRAM(s) Oral three times a day PRN Cough  haloperidol     Tablet 0.5 milliGRAM(s) Oral every 6 hours PRN agitation      RADIOLOGY/ADDITIONAL STUDIES:  < from: Xray Chest 1 View- PORTABLE-Routine (04.17.20 @ 10:02) >    EXAM:  XR CHEST PORTABLE ROUTINE 1V                            PROCEDURE DATE:  04/17/2020          INTERPRETATION:  CLINICAL INDICATION: 87 years  Male with Hypoxia.    COMPARISON: 4/11/2020    AP view of the chest demonstrates bilateral interstitial and airspace infiltrates increased from the prior study. There is no pleural effusion. There is no pneumothorax.    The heart is enlarged. There is no mediastinal or hilar mass.     The pulmonary vasculature is normal.     Mild thoracic degenerative changes are present.    IMPRESSION:    Bilateral interstitial and airspace infiltrates increased from the prior study      DISCRETE X-RAY DATA:  Percent of LEFT lung opacification: 34-66%  Percent of RIGHT lung opacification: 34-66%  Change in lungopacification from most recent x-ray (<=3 days): No Prior  Change from prior dated 3 or more days (same admission): Increase    < end of copied text >  < from: Xray Chest 1 View AP/PA. (04.11.20 @ 21:15) >  EXAM:  XR CHEST 1 VIEW                            PROCEDURE DATE:  04/11/2020          INTERPRETATION:  INDICATION: Shortness of Breath, Cough, Fever    PRIORS: 4/7/2020    VIEWS: Portable AP radiography of the chest performed.    FINDINGS: Heart size appears within normal limits. No superior mediastinal widening is identified. Bilateral lung parenchymal airspace opacities are identified, right greater than left. No pleural effusion or pneumothorax is demonstrated. No mediastinal shift is noted. No acute osseous fractures are identified.    IMPRESSION: Bilateral lung parenchymal airspace opacities, right greater than left.    < end of copied text >

## 2020-04-20 NOTE — PROGRESS NOTE ADULT - PROBLEM SELECTOR PLAN 1
COVID19 associated pneumonia; Sat 91% and RR 22.  No changes from yesterday. continue pravastatin. COVID19 associated pneumonia; Sat 91% and RR 22.  No changes from yesterday other than on NC as opposed to FM. continue pravastatin and abx.

## 2020-04-20 NOTE — PROGRESS NOTE ADULT - SUBJECTIVE AND OBJECTIVE BOX
* Seen during COVID19 pandemic while assisting hospitalist service    HPI:  86 y/o man with a history of IBS, BPH, urinary retention requiring suprapubic catheter, and neuropathy who was transferred from his assisted living facility on 4/11/2020 and found to have COVID19 infection with associated pneumonia; found to be in atrial fibrillation with RVR in the ED with spontaneous return of sinus rhythm.    4/13/20  Chart reviewed , spoke to patient , and  daughter dr dent . Patient without any active cardiac condition , who was admitted with COVID PNEUMONIA  noted to have AFIB PAROXYSM WITH RAPID RATE , Given cardizem which improved his rhythm no recurrence of AFIB , patient is feeling better than when he came in today  , his blood work showed mild elevated troponin   4/14/20 Patient  had high fever yesterday , patient heart rate controlled in sinus rhythm  patient feeling better today , decreased appetite   4/15/20:  No new complaints; comfortable; denies dyspnea at rest; occasional cough, poor appetite.  4/16/20:  Mild dyspnea and cough; pulling at mask (with subsequent desaturation) requiring wrist restraints.  4/17/2020:  No new complaints remains confused; agitated and pulling at tubes.  4/18/2020:  No complaints offered/elicited; confused; less agitated at time of encounter.  4/19/2020:  Patient says he doesn't feel good but no specific complaints offered; denies pain; mild dyspnea, minimal cough, admits to being confused.  4/20/2020: Tired but no new complaints.     MEDICATIONS  (STANDING):  apixaban 2.5 milliGRAM(s) Oral every 12 hours  cefTRIAXone Injectable. 1000 milliGRAM(s) IV Push <User Schedule>  dextrose 5% 1000 milliLiter(s) (60 mL/Hr) IV Continuous <Continuous>  diltiazem    milliGRAM(s) Oral daily  metoprolol tartrate 12.5 milliGRAM(s) Oral every 12 hours  pravastatin 80 milliGRAM(s) Oral at bedtime  QUEtiapine 12.5 milliGRAM(s) Oral at bedtime    MEDICATIONS  (PRN):  acetaminophen   Tablet .. 650 milliGRAM(s) Oral every 6 hours PRN Mild Pain (1 - 3)  acetaminophen   Tablet .. 650 milliGRAM(s) Oral every 4 hours PRN Temp greater or equal to 38.5C (101.3F)  ALBUTerol    90 MICROgram(s) HFA Inhaler 2 Puff(s) Inhalation every 4 hours PRN Shortness of Breath and/or Wheezing  aluminum hydroxide/magnesium hydroxide/simethicone Suspension 30 milliLiter(s) Oral every 4 hours PRN Dyspepsia  benzonatate 100 milliGRAM(s) Oral three times a day PRN Cough  haloperidol     Tablet 0.5 milliGRAM(s) Oral every 6 hours PRN agitation      Vital Signs Last 24 Hrs  T(C): 36.7 (20 Apr 2020 05:22), Max: 36.7 (20 Apr 2020 05:22)  T(F): 98.1 (20 Apr 2020 05:22), Max: 98.1 (20 Apr 2020 05:22)  HR: 99 (20 Apr 2020 05:22) (99 - 107)  BP: 147/78 (20 Apr 2020 05:22) (115/71 - 147/78)  BP(mean): --  RR: 22 (20 Apr 2020 05:22) (22 - 24)  SpO2: 92% (20 Apr 2020 05:22) (83% - 92%)       PHYSICAL EXAM:  Constitutional: lying flat in bed and comfortable. appears stated age and frail  HEENT: Mouth open, no oral cyanosis.   Respiratory: Nonlabored, scattered rhonchi,   CV: S1 and S2, no murmurs or rubs.   Gastrointestinal: Bowel Sounds present, soft.   Extremities: No edema.    LABS:                    13.1   24.74 )-----------( 511      ( 18 Apr 2020 08:58 )             39.4     147<H>  |  116<H>  |  56<H>  ----------------------------<  134<H>  3.8   |  25  |  1.47<H>    TTE Echo Complete w/o contrast w/ Doppler (04.13.20 @ 12:01):   Left ventricle systolic function appears preserved. EF=50%.   Mild concentric hypertrophy.   Mild eccentric aortic regurgitation is present.   Mild (1+) mitral regurgitation is present.    Tele: Sinus rhythm                          12.6   26.85 )-----------( 524      ( 20 Apr 2020 08:29 )             38.8     04-19    155<H>  |  122<H>  |  63<H>  ----------------------------<  144<H>  3.2<L>   |  26  |  1.44<H>    Ca    8.9      19 Apr 2020 08:43    TPro  6.9  /  Alb  2.3<L>  /  TBili  0.4  /  DBili  x   /  AST  63<H>  /  ALT  46  /  AlkPhos  76  04-19        LIVER FUNCTIONS - ( 19 Apr 2020 08:43 )  Alb: 2.3 g/dL / Pro: 6.9 gm/dL / ALK PHOS: 76 U/L / ALT: 46 U/L / AST: 63 U/L / GGT: x * Seen during COVID19 pandemic while assisting hospitalist service    HPI:  88 y/o man with a history of IBS, BPH, urinary retention requiring suprapubic catheter, and neuropathy who was transferred from his assisted living facility on 4/11/2020 and found to have COVID19 infection with associated pneumonia; found to be in atrial fibrillation with RVR in the ED with spontaneous return of sinus rhythm.    4/13/20  Chart reviewed , spoke to patient , and  daughter dr dent . Patient without any active cardiac condition , who was admitted with COVID PNEUMONIA  noted to have AFIB PAROXYSM WITH RAPID RATE , Given cardizem which improved his rhythm no recurrence of AFIB , patient is feeling better than when he came in today  , his blood work showed mild elevated troponin   4/14/20 Patient  had high fever yesterday , patient heart rate controlled in sinus rhythm  patient feeling better today , decreased appetite   4/15/20:  No new complaints; comfortable; denies dyspnea at rest; occasional cough, poor appetite.  4/16/20:  Mild dyspnea and cough; pulling at mask (with subsequent desaturation) requiring wrist restraints.  4/17/2020:  No new complaints remains confused; agitated and pulling at tubes.  4/18/2020:  No complaints offered/elicited; confused; less agitated at time of encounter.  4/19/2020:  Patient says he doesn't feel good but no specific complaints offered; denies pain; mild dyspnea, minimal cough, admits to being confused.  4/20/2020: Tired but no new complaints. still confused but not agitated. Not swallowing well and speech and swallow to see.  Palliative care called. On NC as opposed to fm.      MEDICATIONS  (STANDING):  apixaban 2.5 milliGRAM(s) Oral every 12 hours  cefTRIAXone Injectable. 1000 milliGRAM(s) IV Push <User Schedule>  dextrose 5% 1000 milliLiter(s) (60 mL/Hr) IV Continuous <Continuous>  diltiazem    milliGRAM(s) Oral daily  metoprolol tartrate 12.5 milliGRAM(s) Oral every 12 hours  pravastatin 80 milliGRAM(s) Oral at bedtime  QUEtiapine 12.5 milliGRAM(s) Oral at bedtime    MEDICATIONS  (PRN):  acetaminophen   Tablet .. 650 milliGRAM(s) Oral every 6 hours PRN Mild Pain (1 - 3)  acetaminophen   Tablet .. 650 milliGRAM(s) Oral every 4 hours PRN Temp greater or equal to 38.5C (101.3F)  ALBUTerol    90 MICROgram(s) HFA Inhaler 2 Puff(s) Inhalation every 4 hours PRN Shortness of Breath and/or Wheezing  aluminum hydroxide/magnesium hydroxide/simethicone Suspension 30 milliLiter(s) Oral every 4 hours PRN Dyspepsia  benzonatate 100 milliGRAM(s) Oral three times a day PRN Cough  haloperidol     Tablet 0.5 milliGRAM(s) Oral every 6 hours PRN agitation      Vital Signs Last 24 Hrs  T(C): 36.7 (20 Apr 2020 05:22), Max: 36.7 (20 Apr 2020 05:22)  T(F): 98.1 (20 Apr 2020 05:22), Max: 98.1 (20 Apr 2020 05:22)  HR: 99 (20 Apr 2020 05:22) (99 - 107)  BP: 147/78 (20 Apr 2020 05:22) (115/71 - 147/78)  BP(mean): --  RR: 22 (20 Apr 2020 05:22) (22 - 24)  SpO2: 92% (20 Apr 2020 05:22) (83% - 92%)       PHYSICAL EXAM:  Constitutional: lying flat in bed and comfortable. appears stated age and frail, confused.  HEENT: Mouth open, no oral cyanosis.   Respiratory: Nonlabored, scattered rhonchi,   CV: S1 and S2, no murmurs or rubs.   Gastrointestinal: Bowel Sounds present, soft.   Extremities: No edema.    LABS:                       12.6   26.85 )-----------( 524      ( 20 Apr 2020 08:29 )             38.8     04-20    152<H>  |  121<H>  |  70<H>  ----------------------------<  147<H>  3.5   |  24  |  1.64<H>    Ca    8.4<L>      20 Apr 2020 08:29    TPro  6.6  /  Alb  2.2<L>  /  TBili  0.4  /  DBili  x   /  AST  56<H>  /  ALT  45  /  AlkPhos  74  04-20        LIVER FUNCTIONS - ( 20 Apr 2020 08:29 )  Alb: 2.2 g/dL / Pro: 6.6 gm/dL / ALK PHOS: 74 U/L / ALT: 45 U/L / AST: 56 U/L / GGT: x             TTE Echo Complete w/o contrast w/ Doppler (04.13.20 @ 12:01):   Left ventricle systolic function appears preserved. EF=50%.   Mild concentric hypertrophy.   Mild eccentric aortic regurgitation is present.   Mild (1+) mitral regurgitation is present.    Tele: Sinus rhythm    < from: Xray Chest 1 View- PORTABLE-Routine (04.17.20 @ 10:02) >  DISCRETE X-RAY DATA:  Percent of LEFT lung opacification: 34-66%  Percent of RIGHT lung opacification: 34-66%  Change in lungopacification from most recent x-ray (<=3 days): No Prior  Change from prior dated 3 or more days (same admission): Increase    < end of copied text >

## 2020-04-20 NOTE — SWALLOW BEDSIDE ASSESSMENT ADULT - CONSISTENCIES ADMINISTERED
Pt offered mechanical soft solids, pureed foods, honey thick/nectar thick liquid and thin liquids via spoon-cup.

## 2020-04-20 NOTE — SWALLOW BEDSIDE ASSESSMENT ADULT - ADDITIONAL RECOMMENDATIONS
1) Nutrition follow up. Pt is often non feedable and has Dysphagia. He is at nutrition risk.     2) Palliative Care consult. Do not foresee placement of a feeding tube enhancing life quality. , 1) Nutrition follow up. Pt is often non feedable and has Dysphagia. He is at nutrition risk.     2) Palliative Care consult. Do not foresee placement of a feeding tube enhancing life quality.

## 2020-04-20 NOTE — SWALLOW BEDSIDE ASSESSMENT ADULT - ORAL PREPARATORY PHASE
Pt's alertness for/orientation to feeding were variably reduced. He was fatigued and distractible when PO was offered. Pt sometimes yelled/moaned when PO was offered. Labial grading utensils was functionally reduced.

## 2020-04-20 NOTE — SWALLOW BEDSIDE ASSESSMENT ADULT - SWALLOW EVAL: DIAGNOSIS
1) The pt demonstrates periodically reduced alertness for/orientation to feeding atop Oropharyngeal Dysphagia which subjectively appeared to be a grossly functional condition with a restricted inventory of modified food textures when he is alert/cognizant enough to be fed which is not always the case. Overt aspiration signs noted with liquids less viscous than honey consistency. Effects of acute medical deconditioning(i.e COVID-19, PNA, rapid A-fib, elevated inflammatory markers, encephalopathy) and underlying Dementia are contributory.   2) The pt was arousable but fatigued, internally distractible, restless and variably oppositional. Unable to direct to structured communication tasks but pt did often moan/yell without clear intent. He also periodically spontaneously verbalized without a shania primary speech-language pathology but his verbalizations were typically brief/variably contextually inappropriate consistent with Cognitive Dysfunction(encephalopathy, Dementia).

## 2020-04-20 NOTE — SWALLOW BEDSIDE ASSESSMENT ADULT - ORAL PHASE
Bolus formation/transfer with mechanical soft solids were excessively prolonged and discontinuous. Bolus formation/transfer with pureed foods/liquids were achieved via moderately prolonged disorganized discontinuous lingual pumping actions that were felt to be grossly functional with some of the latter food consistencies. Piecemeal deglutition was evident. Scattered tongue debris noted with mechanical soft solids.

## 2020-04-20 NOTE — SWALLOW BEDSIDE ASSESSMENT ADULT - PHARYNGEAL PHASE
Swallow trigger Swallow trigger was timely to mildly latent, and occurred variably dysynchronously with respiration.  Laryngeal lift on palpation during swallowing trials was mildly to moderately reduced but felt to be grossly functional with some of the above modified food consistencies. Post prandial coughing was demonstrated with thin liquids and less frequently nectar thick liquids despite cues to employ compensatory swallowing maneuvers. NO behavioral aspiration signs exhibited with honey thick liquids, pureed foods and mechanical soft solids.

## 2020-04-20 NOTE — PROGRESS NOTE ADULT - PROBLEM SELECTOR PLAN 2
No chemistries done yet today. await results.  receiving IVF. Slightly less hypernatremic and continue to receive IVF.

## 2020-04-20 NOTE — SWALLOW BEDSIDE ASSESSMENT ADULT - SWALLOW EVAL: SECRETION MANAGEMENT
Limited probes revealed that pt's reflexive non nutritive cough was moist/produced with moderately decreased strength.

## 2020-04-21 LAB
ALBUMIN SERPL ELPH-MCNC: 2 G/DL — LOW (ref 3.3–5)
ALP SERPL-CCNC: 75 U/L — SIGNIFICANT CHANGE UP (ref 40–120)
ALT FLD-CCNC: 39 U/L — SIGNIFICANT CHANGE UP (ref 12–78)
ANION GAP SERPL CALC-SCNC: 7 MMOL/L — SIGNIFICANT CHANGE UP (ref 5–17)
AST SERPL-CCNC: 45 U/L — HIGH (ref 15–37)
BILIRUB SERPL-MCNC: 0.5 MG/DL — SIGNIFICANT CHANGE UP (ref 0.2–1.2)
BUN SERPL-MCNC: 60 MG/DL — HIGH (ref 7–23)
CALCIUM SERPL-MCNC: 8.7 MG/DL — SIGNIFICANT CHANGE UP (ref 8.5–10.1)
CHLORIDE SERPL-SCNC: 122 MMOL/L — HIGH (ref 96–108)
CO2 SERPL-SCNC: 24 MMOL/L — SIGNIFICANT CHANGE UP (ref 22–31)
CREAT SERPL-MCNC: 1.53 MG/DL — HIGH (ref 0.5–1.3)
GLUCOSE SERPL-MCNC: 105 MG/DL — HIGH (ref 70–99)
HCT VFR BLD CALC: 45.1 % — SIGNIFICANT CHANGE UP (ref 39–50)
HGB BLD-MCNC: 14.1 G/DL — SIGNIFICANT CHANGE UP (ref 13–17)
MCHC RBC-ENTMCNC: 27.4 PG — SIGNIFICANT CHANGE UP (ref 27–34)
MCHC RBC-ENTMCNC: 31.3 GM/DL — LOW (ref 32–36)
MCV RBC AUTO: 87.6 FL — SIGNIFICANT CHANGE UP (ref 80–100)
NRBC # BLD: 1 /100 WBCS — HIGH (ref 0–0)
PLATELET # BLD AUTO: 472 K/UL — HIGH (ref 150–400)
POTASSIUM SERPL-MCNC: 4.3 MMOL/L — SIGNIFICANT CHANGE UP (ref 3.5–5.3)
POTASSIUM SERPL-SCNC: 4.3 MMOL/L — SIGNIFICANT CHANGE UP (ref 3.5–5.3)
PROT SERPL-MCNC: 6.4 GM/DL — SIGNIFICANT CHANGE UP (ref 6–8.3)
RBC # BLD: 5.15 M/UL — SIGNIFICANT CHANGE UP (ref 4.2–5.8)
RBC # FLD: 15.8 % — HIGH (ref 10.3–14.5)
SODIUM SERPL-SCNC: 153 MMOL/L — HIGH (ref 135–145)
WBC # BLD: 23.98 K/UL — HIGH (ref 3.8–10.5)
WBC # FLD AUTO: 23.98 K/UL — HIGH (ref 3.8–10.5)

## 2020-04-21 PROCEDURE — 99232 SBSQ HOSP IP/OBS MODERATE 35: CPT

## 2020-04-21 PROCEDURE — 99233 SBSQ HOSP IP/OBS HIGH 50: CPT | Mod: CS

## 2020-04-21 RX ORDER — SODIUM CHLORIDE 9 MG/ML
1000 INJECTION, SOLUTION INTRAVENOUS
Refills: 0 | Status: DISCONTINUED | OUTPATIENT
Start: 2020-04-21 | End: 2020-04-23

## 2020-04-21 RX ADMIN — HYDROMORPHONE HYDROCHLORIDE 0.25 MILLIGRAM(S): 2 INJECTION INTRAMUSCULAR; INTRAVENOUS; SUBCUTANEOUS at 16:18

## 2020-04-21 RX ADMIN — HYDROMORPHONE HYDROCHLORIDE 0.25 MILLIGRAM(S): 2 INJECTION INTRAMUSCULAR; INTRAVENOUS; SUBCUTANEOUS at 21:36

## 2020-04-21 NOTE — PROGRESS NOTE ADULT - SUBJECTIVE AND OBJECTIVE BOX
* Seen during COVID19 pandemic while assisting hospitalist service    HPI:  88 y/o man with a history of IBS, BPH, urinary retention requiring suprapubic catheter, and neuropathy who was transferred from his assisted living facility on 4/11/2020 and found to have COVID19 infection with associated pneumonia; found to be in atrial fibrillation with RVR in the ED with spontaneous return of sinus rhythm.    4/13/20  Chart reviewed , spoke to patient , and  daughter dr dent . Patient without any active cardiac condition , who was admitted with COVID PNEUMONIA  noted to have AFIB PAROXYSM WITH RAPID RATE , Given cardizem which improved his rhythm no recurrence of AFIB , patient is feeling better than when he came in today  , his blood work showed mild elevated troponin   4/14/20 Patient  had high fever yesterday , patient heart rate controlled in sinus rhythm  patient feeling better today , decreased appetite   4/15/20:  No new complaints; comfortable; denies dyspnea at rest; occasional cough, poor appetite.  4/16/20:  Mild dyspnea and cough; pulling at mask (with subsequent desaturation) requiring wrist restraints.  4/17/2020:  No new complaints remains confused; agitated and pulling at tubes.  4/18/2020:  No complaints offered/elicited; confused; less agitated at time of encounter.  4/19/2020:  Patient says he doesn't feel good but no specific complaints offered; denies pain; mild dyspnea, minimal cough, admits to being confused.  4/20/2020: Tired but no new complaints. still confused but not agitated. Not swallowing well and speech and swallow to see.  Palliative care called. On NC as opposed to fm.      MEDICATIONS  (STANDING):  apixaban 2.5 milliGRAM(s) Oral every 12 hours  cefTRIAXone Injectable. 1000 milliGRAM(s) IV Push <User Schedule>  dextrose 5% 1000 milliLiter(s) (60 mL/Hr) IV Continuous <Continuous>  diltiazem    milliGRAM(s) Oral daily  metoprolol tartrate 12.5 milliGRAM(s) Oral every 12 hours  pravastatin 80 milliGRAM(s) Oral at bedtime  QUEtiapine 12.5 milliGRAM(s) Oral at bedtime    MEDICATIONS  (PRN):  acetaminophen   Tablet .. 650 milliGRAM(s) Oral every 6 hours PRN Mild Pain (1 - 3)  acetaminophen   Tablet .. 650 milliGRAM(s) Oral every 4 hours PRN Temp greater or equal to 38.5C (101.3F)  ALBUTerol    90 MICROgram(s) HFA Inhaler 2 Puff(s) Inhalation every 4 hours PRN Shortness of Breath and/or Wheezing  aluminum hydroxide/magnesium hydroxide/simethicone Suspension 30 milliLiter(s) Oral every 4 hours PRN Dyspepsia  benzonatate 100 milliGRAM(s) Oral three times a day PRN Cough  haloperidol     Tablet 0.5 milliGRAM(s) Oral every 6 hours PRN agitation      Vital Signs Last 24 Hrs  T(C): 36.7 (20 Apr 2020 05:22), Max: 36.7 (20 Apr 2020 05:22)  T(F): 98.1 (20 Apr 2020 05:22), Max: 98.1 (20 Apr 2020 05:22)  HR: 99 (20 Apr 2020 05:22) (99 - 107)  BP: 147/78 (20 Apr 2020 05:22) (115/71 - 147/78)  BP(mean): --  RR: 22 (20 Apr 2020 05:22) (22 - 24)  SpO2: 92% (20 Apr 2020 05:22) (83% - 92%)       PHYSICAL EXAM:  Constitutional: lying flat in bed and comfortable. appears stated age and frail, confused.  HEENT: Mouth open, no oral cyanosis.   Respiratory: Nonlabored, scattered rhonchi,   CV: S1 and S2, no murmurs or rubs.   Gastrointestinal: Bowel Sounds present, soft.   Extremities: No edema.    LABS:                       12.6   26.85 )-----------( 524      ( 20 Apr 2020 08:29 )             38.8     04-20    152<H>  |  121<H>  |  70<H>  ----------------------------<  147<H>  3.5   |  24  |  1.64<H>    Ca    8.4<L>      20 Apr 2020 08:29    TPro  6.6  /  Alb  2.2<L>  /  TBili  0.4  /  DBili  x   /  AST  56<H>  /  ALT  45  /  AlkPhos  74  04-20        LIVER FUNCTIONS - ( 20 Apr 2020 08:29 )  Alb: 2.2 g/dL / Pro: 6.6 gm/dL / ALK PHOS: 74 U/L / ALT: 45 U/L / AST: 56 U/L / GGT: x             TTE Echo Complete w/o contrast w/ Doppler (04.13.20 @ 12:01):   Left ventricle systolic function appears preserved. EF=50%.   Mild concentric hypertrophy.   Mild eccentric aortic regurgitation is present.   Mild (1+) mitral regurgitation is present.    Tele: Sinus rhythm    < from: Xray Chest 1 View- PORTABLE-Routine (04.17.20 @ 10:02) >  DISCRETE X-RAY DATA:  Percent of LEFT lung opacification: 34-66%  Percent of RIGHT lung opacification: 34-66%  Change in lungopacification from most recent x-ray (<=3 days): No Prior  Change from prior dated 3 or more days (same admission): Increase    < end of copied text > * Seen during COVID19 pandemic while assisting hospitalist service    HPI:  86 y/o man with a history of IBS, BPH, urinary retention requiring suprapubic catheter, and neuropathy who was transferred from his assisted living facility on 4/11/2020 and found to have COVID19 infection with associated pneumonia; found to be in atrial fibrillation with RVR in the ED with spontaneous return of sinus rhythm.    4/13/20  Chart reviewed , spoke to patient , and  daughter dr dent . Patient without any active cardiac condition , who was admitted with COVID PNEUMONIA  noted to have AFIB PAROXYSM WITH RAPID RATE , Given cardizem which improved his rhythm no recurrence of AFIB , patient is feeling better than when he came in today  , his blood work showed mild elevated troponin   4/14/20 Patient  had high fever yesterday , patient heart rate controlled in sinus rhythm  patient feeling better today , decreased appetite   4/15/20:  No new complaints; comfortable; denies dyspnea at rest; occasional cough, poor appetite.  4/16/20:  Mild dyspnea and cough; pulling at mask (with subsequent desaturation) requiring wrist restraints.  4/17/2020:  No new complaints remains confused; agitated and pulling at tubes.  4/18/2020:  No complaints offered/elicited; confused; less agitated at time of encounter.  4/19/2020:  Patient says he doesn't feel good but no specific complaints offered; denies pain; mild dyspnea, minimal cough, admits to being confused.  4/20/2020: Tired but no new complaints. still confused but not agitated. Not swallowing well and speech and swallow to see.  Palliative care called. On NC as opposed to .    4/21/2020: Mental status not changed.  Had meeting with family yesterday and palliative care and now DNR. Not able to answer question with any coherent answer today.     MEDICATIONS  (STANDING):  apixaban 2.5 milliGRAM(s) Oral every 12 hours  dextrose 5% 1000 milliLiter(s) (60 mL/Hr) IV Continuous <Continuous>  diltiazem    milliGRAM(s) Oral daily  metoprolol tartrate 12.5 milliGRAM(s) Oral every 12 hours  pravastatin 80 milliGRAM(s) Oral at bedtime  QUEtiapine 12.5 milliGRAM(s) Oral at bedtime    MEDICATIONS  (PRN):  acetaminophen   Tablet .. 650 milliGRAM(s) Oral every 6 hours PRN Mild Pain (1 - 3)  acetaminophen   Tablet .. 650 milliGRAM(s) Oral every 4 hours PRN Temp greater or equal to 38.5C (101.3F)  ALBUTerol    90 MICROgram(s) HFA Inhaler 2 Puff(s) Inhalation every 4 hours PRN Shortness of Breath and/or Wheezing  aluminum hydroxide/magnesium hydroxide/simethicone Suspension 30 milliLiter(s) Oral every 4 hours PRN Dyspepsia  benzonatate 100 milliGRAM(s) Oral three times a day PRN Cough  haloperidol     Tablet 0.5 milliGRAM(s) Oral every 6 hours PRN agitation  haloperidol    Injectable 0.5 milliGRAM(s) IV Push every 6 hours PRN agitation  HYDROmorphone  Injectable 0.25 milliGRAM(s) IV Push every 4 hours PRN dyspnea      Vital Signs Last 24 Hrs  T(C): 36.7 (21 Apr 2020 04:56), Max: 36.7 (20 Apr 2020 17:38)  T(F): 98.1 (21 Apr 2020 04:56), Max: 98.1 (20 Apr 2020 17:38)  HR: 91 (21 Apr 2020 04:56) (81 - 105)  BP: 121/85 (21 Apr 2020 04:56) (101/79 - 143/72)  BP(mean): --  RR: 18 (21 Apr 2020 04:56) (18 - 24)  SpO2: 91% (21 Apr 2020 04:56) (90% - 92%)    I&O's Detail      Daily     Daily        PHYSICAL EXAM:  Constitutional: lying flat in bed and comfortable. appears stated age and frail, confused.  HEENT: Mouth open, no oral cyanosis.   Respiratory: Nonlabored, scattered rhonchi,   CV: S1 and S2, no murmurs or rubs.   Gastrointestinal: Bowel Sounds present, soft.   Extremities: No edema.    LABS:                               12.6   26.85 )-----------( 524      ( 20 Apr 2020 08:29 )             38.8     04-20    152<H>  |  121<H>  |  70<H>  ----------------------------<  147<H>  3.5   |  24  |  1.64<H>    Ca    8.4<L>      20 Apr 2020 08:29    TPro  6.6  /  Alb  2.2<L>  /  TBili  0.4  /  DBili  x   /  AST  56<H>  /  ALT  45  /  AlkPhos  74  04-20        LIVER FUNCTIONS - ( 20 Apr 2020 08:29 )  Alb: 2.2 g/dL / Pro: 6.6 gm/dL / ALK PHOS: 74 U/L / ALT: 45 U/L / AST: 56 U/L / GGT: x           TTE Echo Complete w/o contrast w/ Doppler (04.13.20 @ 12:01):   Left ventricle systolic function appears preserved. EF=50%.   Mild concentric hypertrophy.   Mild eccentric aortic regurgitation is present.   Mild (1+) mitral regurgitation is present.    Tele: Sinus rhythm    < from: Xray Chest 1 View- PORTABLE-Routine (04.17.20 @ 10:02) >  DISCRETE X-RAY DATA:  Percent of LEFT lung opacification: 34-66%  Percent of RIGHT lung opacification: 34-66%  Change in lung opacification from most recent x-ray (<=3 days): No Prior  Change from prior dated 3 or more days (same admission): Increase    < end of copied text >

## 2020-04-21 NOTE — PROGRESS NOTE ADULT - PROBLEM SELECTOR PLAN 2
Slightly less hypernatremic and continue to receive IVF. Increase IVF (free h20) as remaining hypernatremic and dehydrated.

## 2020-04-21 NOTE — PROGRESS NOTE ADULT - PROBLEM SELECTOR PLAN 1
COVID19 associated pneumonia; Sat 91% and RR 22.  No changes from yesterday other than on NC as opposed to FM. continue pravastatin and abx. COVID19 associated pneumonia;  No major changes from yesterday. Continue oxygen and treatment.  DNR,

## 2020-04-21 NOTE — PROGRESS NOTE ADULT - SUBJECTIVE AND OBJECTIVE BOX
HPI: Pt is a 87y old Male with hx of IBS, GERD, BPH, urinary retention requiring suprapubic cathete, and idiopathic neuropathy presents to  from [facility]  on 4/11/2020 for further evaluation and management of a 4 day history of intermittent subjective fevers, malaise, shortness of breath, and dry cough. As noted the patient has a known notable recent history for (+) sick contacts at the facility, found to have COVID19 infection with associated pneumonia and to be in atrial fibrillation with RVR now in NSR.    4/20/2020 patient seen and examined at bedside with no family present. patient extremely agitated trying to climb out of bed and yelling, patient has NRB in place with wrist restraints to prevent patient from pulling off mask or pulling on suprapubic catheter. Patient is completly disoriented not able to answer any questions or follow any commands. Spoke with patients daughter Dr. Wilda Barnes who is aware that her dad prognosis is guarded, she stated that before this her mother and father lived together in [facility], her mother who is also presumed positive for Covid is recovering at home.   4/21/2020 patient seen and examined with no family at bedside. Patient is more calm at this time, does not appear to be in any distress at this time. Still not responding to questions or commands.     PAIN: ( )Yes   (x )No  DYSPNEA: (x ) Yes  ( ) No  Level: moderate - severe - NRB in place    Review of Systems:  Unable to obtain/Limited due to: unresponsiveness         PHYSICAL EXAM:    Vital Signs Last 24 Hrs  T(C): 36.7 (21 Apr 2020 04:56), Max: 36.7 (20 Apr 2020 17:38)  T(F): 98.1 (21 Apr 2020 04:56), Max: 98.1 (20 Apr 2020 17:38)  HR: 91 (21 Apr 2020 04:56) (81 - 105)  BP: 121/85 (21 Apr 2020 04:56) (101/79 - 143/72)  RR: 18 (21 Apr 2020 04:56) (18 - 24)  SpO2: 90% (21 Apr 2020 04:56) (90% - 92%)    PPSV2: 30  %  %    General: ill appearing elderly gentleman in bed, very agitated   Mental Status: alert but completely disoriented   HEENT: dry oral mucosa + temporal wasting   Lungs: diminished b/l breath sounds, NRB in place   Cardiac: S1S2 +   GI: non distend, soft, + BS   : suprapubic catheter   Ext: no edema present   Neuro: unable to assess       LABS:                        12.6   26.85 )-----------( 524      ( 20 Apr 2020 08:29 )             38.8     04-20    152<H>  |  121<H>  |  70<H>  ----------------------------<  147<H>  3.5   |  24  |  1.64<H>    Ca    8.4<L>      20 Apr 2020 08:29    TPro  6.6  /  Alb  2.2<L>  /  TBili  0.4  /  DBili  x   /  AST  56<H>  /  ALT  45  /  AlkPhos  74  04-20      Albumin: Albumin, Serum: 2.2 g/dL (04-20 @ 08:29)      Allergies    penicillin (Unknown)    Intolerances      MEDICATIONS  (STANDING):  apixaban 2.5 milliGRAM(s) Oral every 12 hours  dextrose 5% 1000 milliLiter(s) (80 mL/Hr) IV Continuous <Continuous>  diltiazem    milliGRAM(s) Oral daily  metoprolol tartrate 12.5 milliGRAM(s) Oral every 12 hours  pravastatin 80 milliGRAM(s) Oral at bedtime  QUEtiapine 12.5 milliGRAM(s) Oral at bedtime    MEDICATIONS  (PRN):  acetaminophen   Tablet .. 650 milliGRAM(s) Oral every 6 hours PRN Mild Pain (1 - 3)  acetaminophen   Tablet .. 650 milliGRAM(s) Oral every 4 hours PRN Temp greater or equal to 38.5C (101.3F)  ALBUTerol    90 MICROgram(s) HFA Inhaler 2 Puff(s) Inhalation every 4 hours PRN Shortness of Breath and/or Wheezing  aluminum hydroxide/magnesium hydroxide/simethicone Suspension 30 milliLiter(s) Oral every 4 hours PRN Dyspepsia  benzonatate 100 milliGRAM(s) Oral three times a day PRN Cough  haloperidol     Tablet 0.5 milliGRAM(s) Oral every 6 hours PRN agitation  haloperidol    Injectable 0.5 milliGRAM(s) IV Push every 6 hours PRN agitation  HYDROmorphone  Injectable 0.25 milliGRAM(s) IV Push every 4 hours PRN dyspnea      RADIOLOGY:

## 2020-04-21 NOTE — PROGRESS NOTE ADULT - PROBLEM SELECTOR PLAN 4
Suprapubic cathter was changed and on ceftriaxone (Day # 7) for Proteus mirabilis UTI; afebrile; WBC high likely from steroids. remains on ABX from Proteus

## 2020-04-22 ENCOUNTER — TRANSCRIPTION ENCOUNTER (OUTPATIENT)
Age: 85
End: 2020-04-22

## 2020-04-22 DIAGNOSIS — Z78.9 OTHER SPECIFIED HEALTH STATUS: ICD-10-CM

## 2020-04-22 PROCEDURE — 99233 SBSQ HOSP IP/OBS HIGH 50: CPT | Mod: CS

## 2020-04-22 PROCEDURE — 99232 SBSQ HOSP IP/OBS MODERATE 35: CPT

## 2020-04-22 RX ORDER — LIDOCAINE 4 G/100G
1 CREAM TOPICAL
Qty: 0 | Refills: 0 | DISCHARGE

## 2020-04-22 RX ORDER — POLYETHYLENE GLYCOL 3350 17 G/17G
17 POWDER, FOR SOLUTION ORAL
Qty: 0 | Refills: 0 | DISCHARGE

## 2020-04-22 RX ORDER — CHOLECALCIFEROL (VITAMIN D3) 125 MCG
2 CAPSULE ORAL
Qty: 0 | Refills: 0 | DISCHARGE

## 2020-04-22 RX ORDER — ACETAMINOPHEN 500 MG
1 TABLET ORAL
Qty: 0 | Refills: 0 | DISCHARGE

## 2020-04-22 RX ORDER — ACETAMINOPHEN 500 MG
650 TABLET ORAL ONCE
Refills: 0 | Status: COMPLETED | OUTPATIENT
Start: 2020-04-22 | End: 2020-04-22

## 2020-04-22 RX ORDER — HYDROMORPHONE HYDROCHLORIDE 2 MG/ML
0.25 INJECTION INTRAMUSCULAR; INTRAVENOUS; SUBCUTANEOUS
Qty: 0 | Refills: 0 | DISCHARGE
Start: 2020-04-22

## 2020-04-22 RX ADMIN — Medication 650 MILLIGRAM(S): at 23:51

## 2020-04-22 RX ADMIN — HYDROMORPHONE HYDROCHLORIDE 0.25 MILLIGRAM(S): 2 INJECTION INTRAMUSCULAR; INTRAVENOUS; SUBCUTANEOUS at 10:41

## 2020-04-22 RX ADMIN — HYDROMORPHONE HYDROCHLORIDE 0.25 MILLIGRAM(S): 2 INJECTION INTRAMUSCULAR; INTRAVENOUS; SUBCUTANEOUS at 05:33

## 2020-04-22 RX ADMIN — HYDROMORPHONE HYDROCHLORIDE 0.25 MILLIGRAM(S): 2 INJECTION INTRAMUSCULAR; INTRAVENOUS; SUBCUTANEOUS at 15:50

## 2020-04-22 RX ADMIN — HYDROMORPHONE HYDROCHLORIDE 0.25 MILLIGRAM(S): 2 INJECTION INTRAMUSCULAR; INTRAVENOUS; SUBCUTANEOUS at 23:24

## 2020-04-22 NOTE — DISCHARGE NOTE PROVIDER - NSDCCPCAREPLAN_GEN_ALL_CORE_FT
PRINCIPAL DISCHARGE DIAGNOSIS  Diagnosis: COVID-19  Assessment and Plan of Treatment:       SECONDARY DISCHARGE DIAGNOSES  Diagnosis: Hypoxemia  Assessment and Plan of Treatment:     Diagnosis: New onset atrial fibrillation  Assessment and Plan of Treatment:     Diagnosis: Viral pneumonia  Assessment and Plan of Treatment:

## 2020-04-22 NOTE — PROGRESS NOTE ADULT - SUBJECTIVE AND OBJECTIVE BOX
* Seen during COVID19 pandemic while assisting hospitalist service    HPI:  86 y/o man with a history of IBS, BPH, urinary retention requiring suprapubic catheter, and neuropathy who was transferred from his assisted living facility on 4/11/2020 and found to have COVID19 infection with associated pneumonia; found to be in atrial fibrillation with RVR in the ED with spontaneous return of sinus rhythm.    4/13/20  Chart reviewed , spoke to patient , and  daughter dr dent . Patient without any active cardiac condition , who was admitted with COVID PNEUMONIA  noted to have AFIB PAROXYSM WITH RAPID RATE , Given cardizem which improved his rhythm no recurrence of AFIB , patient is feeling better than when he came in today  , his blood work showed mild elevated troponin   4/14/20 Patient  had high fever yesterday , patient heart rate controlled in sinus rhythm  patient feeling better today , decreased appetite   4/15/20:  No new complaints; comfortable; denies dyspnea at rest; occasional cough, poor appetite.  4/16/20:  Mild dyspnea and cough; pulling at mask (with subsequent desaturation) requiring wrist restraints.  4/17/2020:  No new complaints remains confused; agitated and pulling at tubes.  4/18/2020:  No complaints offered/elicited; confused; less agitated at time of encounter.  4/19/2020:  Patient says he doesn't feel good but no specific complaints offered; denies pain; mild dyspnea, minimal cough, admits to being confused.  4/20/2020: Tired but no new complaints. still confused but not agitated. Not swallowing well and speech and swallow to see.  Palliative care called. On NC as opposed to .    4/21/2020: Mental status not changed.  Had meeting with family yesterday and palliative care and now DNR. Not able to answer question with any coherent answer today.   4/22/2020:  Opens eyes with tactile stimulation but lethargic and not answering questions or trying to speak    MEDICATIONS  (STANDING):  apixaban 2.5 milliGRAM(s) Oral every 12 hours  dextrose 5% 1000 milliLiter(s) (80 mL/Hr) IV Continuous <Continuous>  diltiazem    milliGRAM(s) Oral daily  metoprolol tartrate 12.5 milliGRAM(s) Oral every 12 hours  pravastatin 80 milliGRAM(s) Oral at bedtime  QUEtiapine 12.5 milliGRAM(s) Oral at bedtime    MEDICATIONS  (PRN):  acetaminophen   Tablet .. 650 milliGRAM(s) Oral every 6 hours PRN Mild Pain (1 - 3)  acetaminophen   Tablet .. 650 milliGRAM(s) Oral every 4 hours PRN Temp greater or equal to 38.5C (101.3F)  ALBUTerol    90 MICROgram(s) HFA Inhaler 2 Puff(s) Inhalation every 4 hours PRN Shortness of Breath and/or Wheezing  aluminum hydroxide/magnesium hydroxide/simethicone Suspension 30 milliLiter(s) Oral every 4 hours PRN Dyspepsia  benzonatate 100 milliGRAM(s) Oral three times a day PRN Cough  haloperidol     Tablet 0.5 milliGRAM(s) Oral every 6 hours PRN agitation  haloperidol    Injectable 0.5 milliGRAM(s) IV Push every 6 hours PRN agitation  HYDROmorphone  Injectable 0.25 milliGRAM(s) IV Push every 4 hours PRN dyspnea    Vital Signs Last 24 Hrs  T(C): 37 (22 Apr 2020 05:02), Max: 37 (22 Apr 2020 05:02)  T(F): 98.6 (22 Apr 2020 05:02), Max: 98.6 (22 Apr 2020 05:02)  HR: 104 (22 Apr 2020 05:02) (104 - 104)  BP: 108/61 (22 Apr 2020 05:02) (108/61 - 108/61)  RR: 18 (22 Apr 2020 05:02) (18 - 18)  SpO2: 90% (22 Apr 2020 05:02) (90% - 90%)    PHYSICAL EXAM:  Constitutional: Semirecumbent in bed, ill-appearing, encephalopathic  HEENT: Mouth open, supplemental O2 via NRB  Respiratory: No distress but tachypneic  CV: S1 and S2  Gastrointestinal: Bowel Sounds present, abdomen is soft.   Extremities: No leg edema.    LABS:                             14.1   23.98 )-----------( 472      ( 21 Apr 2020 11:08 )             45.1     153<H>  |  122<H>  |  60<H>  ----------------------------<  105<H>  4.3   |  24  |  1.53<H>    Ca    8.7      21 Apr 2020 11:08    TPro  6.4  /  Alb  2.0<L>  /  TBili  0.5  /  DBili  x   /  AST  45<H>  /  ALT  39  /  AlkPhos  75  04-21    TTE Echo Complete w/o contrast w/ Doppler (04.13.20 @ 12:01):   Left ventricle systolic function appears preserved. EF=50%.   Mild concentric hypertrophy.   Mild eccentric aortic regurgitation is present.   Mild (1+) mitral regurgitation is present.    Xray Chest 1 View- PORTABLE-Routine (04.17.20 @ 10:02):  Percent of LEFT lung opacification: 34-66%  Percent of RIGHT lung opacification: 34-66%  Change from prior dated 3 or more days (same admission): Increase

## 2020-04-22 NOTE — GOALS OF CARE CONVERSATION - ADVANCED CARE PLANNING - AGENT'S NAME
Pts wife is primary HCP however, she defers all decision making to her daughter Wilda Lowe who is alternate and will provide a copy of the form

## 2020-04-22 NOTE — PROGRESS NOTE ADULT - PROBLEM SELECTOR PLAN 1
COVID19 associated pneumonia; continues to do poorly despite treatment with   Solu-Medrol, hydroxychloroquine (4/17), pravastatin; supplemental O2.

## 2020-04-22 NOTE — GOALS OF CARE CONVERSATION - ADVANCED CARE PLANNING - CONVERSATION DETAILS
Team spoke with pt's daughter Wilda [phone], who is a Pediatrician, via phone to discuss goals of care, assist with planning and provide supportive counseling. Pt's daughter will send a copy of pt's HCP, she reports pt's wife Rachelle is primary HCP and she is alternate, she reports pt's wife is sick herself and will defer to her. Team then spoke with pt's wife Rachelle via phone and she defers all decision making to her daughter Gustavo Lowe.     Pt. was residing at [facility] prior to admission with his wife. Pt's daughter reports pt. was relatively independent and healthy prior to admission.     Pts current medical condition and goals of care, assist with planning and provide supportive counseling. Pt. is positive for COVID-19. We discussed how pt. is on a 100% NRB and on restraints as he is trying to pull the mask off. Team also discussed that pt. was seen by speech and swallow and was put on a dysphagia one with honey thick liquids but was concerned about his cognitive function if he would be awake enough to eat not that it was a muscle issue. Pt's daughter expressed understanding.     Team discussed the concern that pt. could decline further and need intubation. We explained that this would not be recommended as it is unlikely that he would be able to be extubated. Pt's daughter reports that in the past pts wishes were always DNR/DNI however, a few weeks ago she spoke with him in regards to COVID-19 and he had expressed to her that if he needed to be intubated due to that he would want to. Pt's daughter reports she recognizes pt. likely didn't understand the extent of that and that she is leaning toward having DNR/DNI placed however, needs to speak with her mother and sister before she will do so.     Team reviewed MOLST form in full as well including if pt. would ever want a feeding tube, as currently he is not eating. Pts daughter will review form with her family and get back to our team to complete form. As of now however, no limits have been set and she is aware that in the event pt. declined further and needed resuscitation or intubation it would be done.     Plan at this time is to continue with current management. Pt's daughter will contact our team back regarding pt's wishes for resuscitation and intubation and to complete MOLST form. No limits set at this time. Emotional support provided. Our team will continue to follow.
Team spoke with pt's daughters this morning to follow up and discuss plan. We reviewed pt's medical condition and the concerns  that he is continuing to need 100% NRB and not eating or drinking. Pt's daughters had many medical questions about pt's condition and all were answered. We discussed the option of inpatient hospice as we explained our concern that pt. is nearing the end of his life and they would be able to focus on keeping him comfortable there. Inpatient hospice services explained in detail.    Pt's family took some time to think this over and contacted our team back and would like a referral placed to inpatient hospice for [facility]. [facility] floor SW made aware and will place referral.    Plan at this time is for referral to [facility] for inpatient hospice. Emotional support provided. Our team will continue to follow.
Pts daughter Wilda contacted our team back with her two sisters to complete MOLST via phone. Team reviewed MOLST in detail and completed form. MOLST states DNR/DNI, Limited Medical, Send to hospital, No feeding tube, Trial of IV fluids, Use antibiotics.    Pts family is not ready for a strict comfort focus. They would like to continue with current medical management however, have set limits for DNR/DNI and no feeding tube.     Plan to be further determined based on pts improvement. Emotional support provided. Our team will continue to follow.
Team spoke with pts daughter Wilda [phone], who is a Pediatrician, via phone to discuss goals of care, assist with planning and provide supportive counseling. Pts daughter will send a copy of pts HCP, she reports pts wife Rachelle is primary HCP and she is alternate, she reports pts wife is sick herself and will defer to her. Team then spoke with pts wife Rachelle via phone and she defers all decision making to her daughter Gustavo Lowe.     Pt. was residing at [facility] prior to admission with his wife. Pts daughter reports pt. was relatively independent and healthy prior to admission.     Pts current medical condition and goals of care, assist with planning and provide supportive counseling. Pt. is positive for COVID-19. We discussed how pt. is on a 100% NRB and on restraints as he is trying to pull the mask off. Team also discussed that pt. was seen by speech and swallow and was put on a dysphagia one with honey thick liquids but was concerned about his cognitive function if he would be awake enough to eat not that it was a muscle issue. Pts daughter expressed understanding.     Team discussed the concern that pt. could decline further and need intubation. We explained that this would not be recommended as it is unlikely that he would be able to be extubated. Pts daughter reports that in the past pts wishes were always DNR/DNI however, a few weeks ago she spoke with him in regards to COVID-19 and he had expressed to her that if he needed to be intubated due to that he would want to. Pts daughter reports she recognizes pt. likely didn't understand the extent of that and that she is leaning toward having DNR/DNI placed however, needs to speak with her mother and sister before she will do so.     Team reviewed MOLST form in full as well including if pt. would ever want a feeding tube, as currently he is not eating. Pts daughter will review form with her family and get back to our team to complete form. As of now however, no limits have been set and she is aware that in the event pt. declined further and needed resuscitation or intubation it would be done.     Plan at this time is to continue with current management. Pts daughter will contact our team back regarding pts wishes for resuscitation and intubation and to complete MOLST form. No limits set at this time. Emotional support provided. Our team will continue to follow.
Team spoke with pts daughters this morning to follow up and discuss plan. We reviewed pts medical condition and the concerns  that he is continuing to need 100% NRB and not eating or drinking. Pts daughters had many medical questions about pts condition and all were answered. We discussed the option of inpatient hospice as we explained our concern that pt. is nearing the end of his life and they would be able to focus on keeping him comfortable there. Inpatient hospice services explained in detail.    Pts family took some time to think this over and contacted our team back and would like a referral placed to inpatient hospice for [facility]. [facility] floor SW made aware and will place referral.    Plan at this time is for referral to [facility] for inpatient hospice. Emotional support provided. Our team will continue to follow.

## 2020-04-22 NOTE — DISCHARGE NOTE PROVIDER - HOSPITAL COURSE
* Admitted to [facility] during COVID19 Pandemic        Mr. 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  In the ED he had an elevated temperature (100 degrees) and was diagnosed with COVID19 infection with bilateral lung parenchymal airspace opacities consistent with viral pneumonia.  He was also found to have new onset atrial fibrillation (followed by spontaneous return of sinus rhythm with administration of diltiazem during his hospitalization). He was treated with hydroxychloroquine (completed 4/17) and Solu-Medrol (4/19) but remained hypoxic and required supplemental O2 via NRB.  Chest radiograph revealed a worsening of his bilateral interstitial and airspace infiltrates.  His suprapubic catheter was changed by Dr Fabien Schofield (urologist) on 4/14/2020 and he was treated with a 6 day course of ceftriaxone for Proteus mirabilis urinary tract infection (completed 4/20/2020).  His hospitalization was complicated by a progressive decline in his mental status (including confusion, disorientation, agitation, and somnolence) resulting in decreased oral intake, hypernatremia (treated with IVF), and ultimately an inability to tolerate any oral medications (starting on 4/20-21/2020) or diet.  He was seen by the palliative care service on 4/20/2020 and a decision was made on 4/22/2020 to transfer Mr Lowe to hospice.        Vital Signs Last 24 Hrs    T(C): 37 (22 Apr 2020 05:02), Max: 37 (22 Apr 2020 05:02)    T(F): 98.6 (22 Apr 2020 05:02), Max: 98.6 (22 Apr 2020 05:02)    HR: 104 (22 Apr 2020 05:02) (104 - 104)    BP: 108/61 (22 Apr 2020 05:02) (108/61 - 108/61)    RR: 18 (22 Apr 2020 05:02) (18 - 18)    SpO2: 90% (22 Apr 2020 05:02) (90% - 90%)        PHYSICAL EXAM:    Constitutional: Semirecumbent in bed, ill-appearing, encephalopathic    HEENT: Mouth open, supplemental O2 via NRB    Respiratory: No distress but tachypneic    CV: S1 and S2    Gastrointestinal: Bowel Sounds present, abdomen is soft.     Extremities: No leg edema.        LABS:                               14.1     23.98 )-----------( 472      ( 21 Apr 2020 11:08 )               45.1         153<H>  |  122<H>  |  60<H>    ----------------------------<  105<H>    4.3   |  24  |  1.53<H>        Ca    8.7      21 Apr 2020 11:08    TPro  6.4  /  Alb  2.0<L>  /  TBili  0.5  /  DBili  x   /  AST  45<H>  /  ALT  39  /  AlkPhos  75  04-21        TTE Echo Complete w/o contrast w/ Doppler (04.13.20 @ 12:01):     Left ventricle systolic function appears preserved. EF=50%.     Mild concentric hypertrophy.     Mild eccentric aortic regurgitation is present.     Mild (1+) mitral regurgitation is present.        Xray Chest 1 View- PORTABLE-Routine (04.17.20 @ 10:02):  Bilateral interstitial and airspace infiltrates increased from the prior study. Mr. 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  In the ED he had an elevated temperature (100 degrees) and was diagnosed with COVID19 infection with bilateral lung parenchymal airspace opacities consistent with viral pneumonia.  He was also found to have new onset atrial fibrillation (followed by spontaneous return of sinus rhythm with administration of diltiazem during his hospitalization). He was treated with hydroxychloroquine (completed 4/17) and Solu-Medrol (4/19) but remained hypoxic and required supplemental O2 via NRB.  Chest radiograph revealed a worsening of his bilateral interstitial and airspace infiltrates.  His suprapubic catheter was changed by Dr Fabien Schofield (urologist) on 4/14/2020 and he was treated with a 6 day course of ceftriaxone for Proteus mirabilis urinary tract infection (completed 4/20/2020).  His hospitalization was complicated by a progressive decline in his mental status (including confusion, disorientation, agitation, and somnolence) resulting in decreased oral intake, hypernatremia (treated with IVF), and ultimately an inability to tolerate any oral medications (starting on 4/20-21/2020) or diet.  He was seen by the palliative care service on 4/20/2020 and a decision was made on 4/22/2020 to transfer Mr Lowe to hospice.        * Admitted to [facility] during COVID19 Pandemic        Vital Signs Last 24 Hrs    T(C): 37 (22 Apr 2020 05:02), Max: 37 (22 Apr 2020 05:02)    T(F): 98.6 (22 Apr 2020 05:02), Max: 98.6 (22 Apr 2020 05:02)    HR: 104 (22 Apr 2020 05:02) (104 - 104)    BP: 108/61 (22 Apr 2020 05:02) (108/61 - 108/61)    RR: 18 (22 Apr 2020 05:02) (18 - 18)    SpO2: 90% (22 Apr 2020 05:02) (90% - 90%)        PHYSICAL EXAM:    Constitutional: Semirecumbent in bed, ill-appearing, encephalopathic    HEENT: Mouth open, supplemental O2 via NRB    Respiratory: No distress but tachypneic    CV: S1 and S2    Gastrointestinal: Bowel Sounds present, abdomen is soft.     Extremities: No leg edema.        LABS:                               14.1     23.98 )-----------( 472      ( 21 Apr 2020 11:08 )               45.1         153<H>  |  122<H>  |  60<H>    ----------------------------<  105<H>    4.3   |  24  |  1.53<H>        Ca    8.7      21 Apr 2020 11:08    TPro  6.4  /  Alb  2.0<L>  /  TBili  0.5  /  DBili  x   /  AST  45<H>  /  ALT  39  /  AlkPhos  75  04-21        TTE Echo Complete w/o contrast w/ Doppler (04.13.20 @ 12:01):     Left ventricle systolic function appears preserved. EF=50%.     Mild concentric hypertrophy.     Mild eccentric aortic regurgitation is present.     Mild (1+) mitral regurgitation is present.        Xray Chest 1 View- PORTABLE-Routine (04.17.20 @ 10:02):  Bilateral interstitial and airspace infiltrates increased from the prior study.

## 2020-04-22 NOTE — GOALS OF CARE CONVERSATION - ADVANCED CARE PLANNING - FAMILY/CHILD(REN)
pts 3 daughter via phone Peggy Astudillo
pts daughter Wilda (alternate HCP) and pts two other daughters
pts daughter Wilda via phone

## 2020-04-22 NOTE — DISCHARGE NOTE PROVIDER - NSDCMRMEDTOKEN_GEN_ALL_CORE_FT
lidocaine 4% topical cream: Apply topically to affected area 3 times a day, As Needed  MiraLax oral powder for reconstitution: 17 gram(s) orally once a day  Tylenol Extra Strength 500 mg oral tablet: 1 tab(s) orally once a day (at bedtime)  Vitamin D3 1000 intl units (25 mcg) oral tablet: 2 tab(s) orally once a day HYDROmorphone: 0.25 milligram(s) injectable every 4 hours, As Needed

## 2020-04-22 NOTE — GOALS OF CARE CONVERSATION - ADVANCED CARE PLANNING - CONVERSATION/DISCUSSION
Prognosis/Treatment Options/Diagnosis/MOLST Discussed
Prognosis/Treatment Options/Diagnosis/MOLST Discussed
MOLST Discussed
MOLST Discussed

## 2020-04-22 NOTE — PROGRESS NOTE ADULT - PROBLEM SELECTOR PLAN 7
Doing poorly; appreciate palliative care assistance - hospice care appropriate; another meeting planned for this AM.
On 4/17/2020 I had conversations with 2 of his daughters (Wilda - a pediatrician, and Carine) and we discussed severity of illness and lack of clinical improvement with worsening inflammatory markers.  Wilda reports 2 recent discussions (1 at his assisted living facility and again in the  ER) in which Mr Lowe said he would want to ventilatory assistance if required.  Anticipate requesting assistance from palliative care team if continued lack of improvement.

## 2020-04-22 NOTE — PROGRESS NOTE ADULT - PROBLEM SELECTOR PROBLEM 1
COVID-19
New onset atrial fibrillation
COVID-19

## 2020-04-22 NOTE — PROGRESS NOTE ADULT - SUBJECTIVE AND OBJECTIVE BOX
HPI: Pt is a 87y old Male with hx of IBS, GERD, BPH, urinary retention requiring suprapubic cathete, and idiopathic neuropathy presents to  from [facility]  on 4/11/2020 for further evaluation and management of a 4 day history of intermittent subjective fevers, malaise, shortness of breath, and dry cough. As noted the patient has a known notable recent history for (+) sick contacts at the facility, found to have COVID19 infection with associated pneumonia and to be in atrial fibrillation with RVR now in NSR.    4/20/2020 patient seen and examined at bedside with no family present. patient extremely agitated trying to climb out of bed and yelling, patient has NRB in place with wrist restraints to prevent patient from pulling off mask or pulling on suprapubic catheter. Patient is completly disoriented not able to answer any questions or follow any commands. Spoke with patients daughter Dr. Wilda Barnes who is aware that her dad prognosis is guarded, she stated that before this her mother and father lived together in [facility], her mother who is also presumed positive for Covid is recovering at home.   4/21/2020 patient seen and examined with no family at bedside. Patient is more calm at this time, does not appear to be in any distress at this time. Still not responding to questions or commands    PAIN: ( )Yes   (x )No  DYSPNEA: (x ) Yes  ( ) No  Level: moderate - severe - NRB in place    Review of Systems:    Unable to obtain/Limited due to: dementia and lethargy       PHYSICAL EXAM:    Vital Signs Last 24 Hrs  T(C): 36.5 (22 Apr 2020 11:26), Max: 37 (22 Apr 2020 05:02)  T(F): 97.7 (22 Apr 2020 11:26), Max: 98.6 (22 Apr 2020 05:02)  HR: 107 (22 Apr 2020 11:26) (104 - 107)  BP: 95/60 (22 Apr 2020 11:26) (95/60 - 108/61)  RR: 22 (22 Apr 2020 11:26) (18 - 22)  SpO2: 90% (22 Apr 2020 05:02) (90% - 90%)    PPSV2: 30 %  FAST: 7    General: ill appearing elderly gentleman in bed, lethargic and less agitated today    Mental Status: lethargic opens eyes but not answering questions or following any commands   HEENT: dry oral mucosa + temporal wasting   Lungs: diminished b/l breath sounds, NRB in place   Cardiac: S1S2 +   GI: non distend, soft, + BS   : suprapubic catheter   Ext: no edema present   Neuro: unable to assess         LABS:                        14.1   23.98 )-----------( 472      ( 21 Apr 2020 11:08 )             45.1     04-21    153<H>  |  122<H>  |  60<H>  ----------------------------<  105<H>  4.3   |  24  |  1.53<H>    Ca    8.7      21 Apr 2020 11:08    TPro  6.4  /  Alb  2.0<L>  /  TBili  0.5  /  DBili  x   /  AST  45<H>  /  ALT  39  /  AlkPhos  75  04-21      Albumin: Albumin, Serum: 2.0 g/dL (04-21 @ 11:08)      Allergies    penicillin (Unknown)    Intolerances      MEDICATIONS  (STANDING):  apixaban 2.5 milliGRAM(s) Oral every 12 hours  dextrose 5% 1000 milliLiter(s) (80 mL/Hr) IV Continuous <Continuous>  diltiazem    milliGRAM(s) Oral daily  metoprolol tartrate 12.5 milliGRAM(s) Oral every 12 hours  pravastatin 80 milliGRAM(s) Oral at bedtime  QUEtiapine 12.5 milliGRAM(s) Oral at bedtime    MEDICATIONS  (PRN):  acetaminophen   Tablet .. 650 milliGRAM(s) Oral every 6 hours PRN Mild Pain (1 - 3)  acetaminophen   Tablet .. 650 milliGRAM(s) Oral every 4 hours PRN Temp greater or equal to 38.5C (101.3F)  ALBUTerol    90 MICROgram(s) HFA Inhaler 2 Puff(s) Inhalation every 4 hours PRN Shortness of Breath and/or Wheezing  aluminum hydroxide/magnesium hydroxide/simethicone Suspension 30 milliLiter(s) Oral every 4 hours PRN Dyspepsia  benzonatate 100 milliGRAM(s) Oral three times a day PRN Cough  haloperidol     Tablet 0.5 milliGRAM(s) Oral every 6 hours PRN agitation  haloperidol    Injectable 0.5 milliGRAM(s) IV Push every 6 hours PRN agitation  HYDROmorphone  Injectable 0.25 milliGRAM(s) IV Push every 4 hours PRN dyspnea      RADIOLOGY:

## 2020-04-22 NOTE — PROGRESS NOTE ADULT - PROBLEM SELECTOR PLAN 3
No longer on telemetry; past monitoring revealed an underlying sinus rhythm; continue Cardizem, Metoprolol, Eliquis (if mental status allows PO meds).

## 2020-04-23 VITALS — TEMPERATURE: 98 F | DIASTOLIC BLOOD PRESSURE: 87 MMHG | SYSTOLIC BLOOD PRESSURE: 105 MMHG | HEART RATE: 129 BPM

## 2020-04-23 PROCEDURE — 99232 SBSQ HOSP IP/OBS MODERATE 35: CPT

## 2020-04-23 PROCEDURE — 99239 HOSP IP/OBS DSCHRG MGMT >30: CPT | Mod: CS

## 2020-04-23 RX ORDER — ACETAMINOPHEN 500 MG
650 TABLET ORAL ONCE
Refills: 0 | Status: COMPLETED | OUTPATIENT
Start: 2020-04-23 | End: 2020-04-23

## 2020-04-23 RX ORDER — HYDROMORPHONE HYDROCHLORIDE 2 MG/ML
0.3 INJECTION INTRAMUSCULAR; INTRAVENOUS; SUBCUTANEOUS
Refills: 0 | Status: DISCONTINUED | OUTPATIENT
Start: 2020-04-23 | End: 2020-04-23

## 2020-04-23 RX ADMIN — Medication 650 MILLIGRAM(S): at 04:24

## 2020-04-23 RX ADMIN — HYDROMORPHONE HYDROCHLORIDE 0.3 MILLIGRAM(S): 2 INJECTION INTRAMUSCULAR; INTRAVENOUS; SUBCUTANEOUS at 08:07

## 2020-04-23 RX ADMIN — HYDROMORPHONE HYDROCHLORIDE 0.3 MILLIGRAM(S): 2 INJECTION INTRAMUSCULAR; INTRAVENOUS; SUBCUTANEOUS at 17:21

## 2020-04-23 RX ADMIN — HYDROMORPHONE HYDROCHLORIDE 0.3 MILLIGRAM(S): 2 INJECTION INTRAMUSCULAR; INTRAVENOUS; SUBCUTANEOUS at 11:18

## 2020-04-23 NOTE — PROVIDER CONTACT NOTE (OTHER) - ASSESSMENT
BP 81/44. IVF running as ordered. Temp 101.1. O2 saturation 83% on NRB. Pt using accessory muscles to breath, tachypneic.

## 2020-04-23 NOTE — PROGRESS NOTE ADULT - REASON FOR ADMISSION
Fever, Cough
Respiratory failure
Fever, Cough

## 2020-04-23 NOTE — PROVIDER CONTACT NOTE (CHANGE IN STATUS NOTIFICATION) - SITUATION
Bedside RN assessing patient, noting lack of respirations, lack of femoral pulse. Charge RN called to bedside.

## 2020-04-23 NOTE — DISCHARGE NOTE FOR THE EXPIRED PATIENT - HOSPITAL COURSE
HPI: Pt is a 87y old Male with hx of IBS, GERD, BPH, dementia, urinary retention requiring suprapubic cathete, and idiopathic neuropathy presents to  from [facility]  on 4/11/2020 for further evaluation and management of a 4 day history of intermittent subjective fevers, malaise, shortness of breath, and dry cough. As noted the patient has a known notable recent history for (+) sick contacts at the facility, found to have COVID19 infection with associated pneumonia and to be in atrial fibrillation with RVR now in NSR.    HOSPITAL COURSE:   Pt was admitted with acute hypoxic resp failure 2/2  COVID19 associated pneumonia; continues to do poorly despite treatment with   Solu-Medrol, pravastatin; supplemental O2 etc. Pt also had new-onset Afib with RVR coverted to sinus.   Pt was seen by Pall Care team for  Advance Care planning: pt's wife is HCP but deferred decisions making to daughter who is alternate  Family chose to pursue hospice care which is appropriate at this time.   Pt has been started on Dilaudid PRN for resp distress; Haldol PRN  for agitation    Pt was doing breathing comfortably with dilaudid prn this morning when I examined him.  Called by RN this evening who pronounced him dead at 530pm.  Pall Care team Aleah Marinelli who has been in close touch with the family informed the patient's daughter.  Communicable disease form, death certificate underway.     time spent on discharge - 55 mins  discussed with pall care team, staff at [facility]

## 2020-04-23 NOTE — PROGRESS NOTE ADULT - SUBJECTIVE AND OBJECTIVE BOX
HPI: Pt is a 87y old Male with hx of IBS, GERD, BPH, urinary retention requiring suprapubic cathete, and idiopathic neuropathy presents to  from [facility]  on 4/11/2020 for further evaluation and management of a 4 day history of intermittent subjective fevers, malaise, shortness of breath, and dry cough. As noted the patient has a known notable recent history for (+) sick contacts at the facility, found to have COVID19 infection with associated pneumonia and to be in atrial fibrillation with RVR now in NSR.    4/23/2020 patient seen and examined with no family at the bedside. Patient unresponsive at this time, Patient appears comfortable at this time. Spoke with daughter Wilda who would like patient to stay in the hospital, explained that at this time would keep patient here and continue to do focus on making the patient comfortable.     PAIN: ( )Yes   (x )No  DYSPNEA: (x ) Yes  ( ) No  Level: moderate - severe - NRB in place    Review of Systems:    Unable to obtain/Limited due to: dementia and lethargy     PHYSICAL EXAM:    Vital Signs Last 24 Hrs  T(C): 38 (23 Apr 2020 06:43), Max: 38.4 (23 Apr 2020 02:16)  T(F): 100.4 (23 Apr 2020 06:43), Max: 101.1 (23 Apr 2020 02:16)  HR: 109 (23 Apr 2020 04:09) (105 - 112)  BP: 81/44 (23 Apr 2020 04:09) (81/44 - 129/64)  RR: 24 (23 Apr 2020 04:09) (22 - 24)  SpO2: 83% (23 Apr 2020 04:09) (83% - 90%)    PPSV2: 30 %  FAST: 7    General: ill appearing elderly gentleman in bed, lethargic and less agitated today    Mental Status: lethargic opens eyes but not answering questions or following any commands   HEENT: dry oral mucosa + temporal wasting   Lungs: diminished b/l breath sounds, NRB in place   Cardiac: S1S2 +   GI: non distend, soft, + BS   : suprapubic catheter   Ext: no edema present   Neuro: unable to assess     LABS:                        14.1   23.98 )-----------( 472      ( 21 Apr 2020 11:08 )             45.1     04-21    153<H>  |  122<H>  |  60<H>  ----------------------------<  105<H>  4.3   |  24  |  1.53<H>    Ca    8.7      21 Apr 2020 11:08    TPro  6.4  /  Alb  2.0<L>  /  TBili  0.5  /  DBili  x   /  AST  45<H>  /  ALT  39  /  AlkPhos  75  04-21      Albumin: Albumin, Serum: 2.0 g/dL (04-21 @ 11:08)      Allergies    penicillin (Unknown)    Intolerances      MEDICATIONS  (STANDING):  apixaban 2.5 milliGRAM(s) Oral every 12 hours  dextrose 5% 1000 milliLiter(s) (80 mL/Hr) IV Continuous <Continuous>  diltiazem    milliGRAM(s) Oral daily  metoprolol tartrate 12.5 milliGRAM(s) Oral every 12 hours  pravastatin 80 milliGRAM(s) Oral at bedtime  QUEtiapine 12.5 milliGRAM(s) Oral at bedtime    MEDICATIONS  (PRN):  acetaminophen   Tablet .. 650 milliGRAM(s) Oral every 6 hours PRN Mild Pain (1 - 3)  acetaminophen   Tablet .. 650 milliGRAM(s) Oral every 4 hours PRN Temp greater or equal to 38.5C (101.3F)  ALBUTerol    90 MICROgram(s) HFA Inhaler 2 Puff(s) Inhalation every 4 hours PRN Shortness of Breath and/or Wheezing  aluminum hydroxide/magnesium hydroxide/simethicone Suspension 30 milliLiter(s) Oral every 4 hours PRN Dyspepsia  benzonatate 100 milliGRAM(s) Oral three times a day PRN Cough  haloperidol    Injectable 0.5 milliGRAM(s) IV Push every 6 hours PRN agitation  HYDROmorphone  Injectable 0.3 milliGRAM(s) IV Push every 2 hours PRN dyspnea

## 2020-04-23 NOTE — PROGRESS NOTE ADULT - ASSESSMENT
88 y/o M PMHx significant for IBS, GERD, BPH, urinary retention, and idiopathic neuropathy presents to  from [facility] for further evaluation and management of a 4 day history of intermittent subjective fevers, malaise, shortness of breath, and dry cough. As noted the patient has a known notable recent history for (+) sick contacts at the facility. SaO2 on supplemental O2 w/ 2L/min nasal cannula-> 92%. In the ED the patient was found to be in AFib w/ RVR. Labs => WBC 10.64, Hgb/Hct 12.9/39.0, Fibrinogen 1279, INR 1.47, D-dimer 718, Na 134, BUN/Cr 28/1.42, Alb 2.6, , CRP 22.74, Ferritin 726, , CPK 1678, UA (+), COVID-19 (+). CXR => bilateral infiltrates. In the ED the patient was started on a Heparin gtt per protocol.    #Viral Pneumonia secondary to Novel Coronavirus Infection  ~admit to Telemetry  ~maintain on airborne isolation.  ~cont. supplemental O2 as needed via nasal cannula and up-titrate as needed.   ~will hold on starting Hydroxychloroquine due to new onset Afib w/ RVR  ~cont. antipyretics w/ Acetaminophen 650 mg PO q4h PRN fever. Limit use of NSAIDs.  ~cont. HFA albuterol Q6 hour PRN via MDI. Would avoid nebulized preparations to limit risk of aerosol formation.    #Afib w/ RVR  Now in sinus rhythm   ~serial Lisbet/EKGs  ~cont. Heparin gtt per protocol
88 yo M with chronic indwelling SP tube, now changed. Follow up in 1 month for routine catheter change.
Pt is a 87y old Male with hx of IBS, GERD, BPH, urinary retention requiring suprapubic cathete, and idiopathic neuropathy presents to  from [facility]  on 4/11/2020 for further evaluation and management of a 4 day history of intermittent subjective fevers, malaise, shortness of breath, and dry cough. As noted the patient has a known notable recent history for (+) sick contacts at the facility, found to have COVID19 infection with associated pneumonia and to be in atrial fibrillation with RVR now in NSR.    1) Acute Respiratory failure with Hypoxia    - Viral Pneumonia   - Novel Coronavirus Infection  - airborne isolation.  - cont. supplemental O2 on NRB   - not a candidate for Hydroxychloroquine due to new onset Afib w/ RVR  - Dilaudid 0.3mg IV q4hrs PRN for dyspnea     2) Afib w/ RVR  - continue to monitor on telemetry   - c/w Eliquis    - Cardiology consult appreciated   - cont. rate control    3) Atonic bladder.    - Suprapubic cathter was changed   - c/w Ceftriaxone  - continue to monitor   - ID consult appreciated     4) Severe Protein calorie malnutrition  - Speech and swallow eval appreciated   -  supportive care     5) Delirium/agitation    - Haldol 0.5mg PO q6hrs for agitation   - Seroquel 12.5 PO at bedtime   - will add haldol 0.5 mg IV if unable to tolerate PO     6) Advance Care planning   - Wife is HCP but deferred decisions making to daughter who is alternate  - GOC held 4/20/2020 daughter Wilda Lowe will discuss GOC with her sisters and call us back with their decisions explained that patients prognosis is guarded family is aware and would like some time to discuss it together.   - MOLST : DNR/I on chart   - 4/22/2020 patients family would like referral made to [facility] spoke with Nerissa   - 4/23/2020 patients family would like him to stay in hospital at this time as we think he is nearing the end of his life. Continue with comfort measures at this time.
Pt is a 87y old Male with hx of IBS, GERD, BPH, urinary retention requiring suprapubic cathete, and idiopathic neuropathy presents to  from [facility]  on 4/11/2020 for further evaluation and management of a 4 day history of intermittent subjective fevers, malaise, shortness of breath, and dry cough. As noted the patient has a known notable recent history for (+) sick contacts at the facility, found to have COVID19 infection with associated pneumonia and to be in atrial fibrillation with RVR now in NSR.    1) Acute Respiratory failure with Hypoxia    - Viral Pneumonia   - Novel Coronavirus Infection  - airborne isolation.  - cont. supplemental O2 on NRB   - not a candidate for Hydroxychloroquine due to new onset Afib w/ RVR    2) Afib w/ RVR  - continue to monitor on telemetry   - c/w Eliquis    - Cardiology consult appreciated   - cont. rate control    3) Atonic bladder.    - Suprapubic cathter was changed   - c/w Ceftriaxone  - continue to monitor   - ID consult appreciated     4) Severe Protein calorie malnutrition  - Speech and swallow eval appreciated   -  supportive care     5) Delirium/agitation    - Haldol 0.5mg PO q6hrs for agitation   - Seroquel 12.5 PO at bedtime   - will add haldol 0.5 mg IV if unable to tolerate PO     6) Advance Care planning   - Wife is HCP but deferred decisions making to daughter who is alternate  - GOC held 4/20/2020 daughter Wilda Lowe will discuss GOC with her sisters and call us back with their decisions explained that patients prognosis is guarded family is aware and would like some time to discuss it together.   - MOLST : DNR/I on chart   - 4/22/2020 patients family would like referral made to [facility] spoke with Nerissa
Pt is a 87y old Male with hx of IBS, GERD, BPH, urinary retention requiring suprapubic cathete, and idiopathic neuropathy presents to  from [facility]  on 4/11/2020 for further evaluation and management of a 4 day history of intermittent subjective fevers, malaise, shortness of breath, and dry cough. As noted the patient has a known notable recent history for (+) sick contacts at the facility, found to have COVID19 infection with associated pneumonia and to be in atrial fibrillation with RVR now in NSR.    1) Acute Respiratory failure with Hypoxia    - Viral Pneumonia   - Novel Coronavirus Infection  - airborne isolation.  - cont. supplemental O2 on NRB   - not a candidate for Hydroxychloroquine due to new onset Afib w/ RVR    2) Afib w/ RVR  - continue to monitor on telemetry   - c/w Eliquis    - Cardiology consult appreciated   - cont. rate control    3) Atonic bladder.    - Suprapubic cathter was changed   - c/w Ceftriaxone  - continue to monitor   - ID consult appreciated     4) Severe Protein calorie malnutrition  - Speech and swallow eval appreciated   -  supportive care     5) Delirium/agitation    - Haldol 0.5mg PO q6hrs for agitation   - Seroquel 12.5 PO at bedtime   - will add haldol 0.5 mg IV if unable to tolerate PO     6) Advance Care planning   - Wife is HCP but deferred decisions making to daughter who is alternate  - GOC held 4/20/2020 daughter Wilda Lowe will discuss GOC with her sisters and call us back with their decisions explained that patients prognosis is guarded family is aware and would like some time to discuss it together.   - MOLST : DNR/I on chart
speeach and swallow and palliative care to see.
88 y/o M PMHx significant for IBS, GERD, BPH, urinary retention, and idiopathic neuropathy presents to  from [facility] for further evaluation and management of a 4 day history of intermittent subjective fevers, malaise, shortness of breath, and dry cough. As noted the patient has a known notable recent history for (+) sick contacts at the facility. SaO2 on supplemental O2 w/ 2L/min nasal cannula-> 92%. In the ED the patient was found to be in AFib w/ RVR. Labs => WBC 10.64, Hgb/Hct 12.9/39.0, Fibrinogen 1279, INR 1.47, D-dimer 718, Na 134, BUN/Cr 28/1.42, Alb 2.6, , CRP 22.74, Ferritin 726, , CPK 1678, UA (+), COVID-19 (+). CXR => bilateral infiltrates. In the ED the patient was started on a Heparin gtt per protocol.    #Viral Pneumonia secondary to Novel Coronavirus Infection  ~admit to Telemetry  ~maintain on airborne isolation.  ~cont. supplemental O2 as needed via nasal cannula and up-titrate as needed.   ~will hold on starting Hydroxychloroquine due to new onset Afib w/ RVR  ~cont. antipyretics w/ Acetaminophen 650 mg PO q4h PRN fever. Limit use of NSAIDs.  ~cont. HFA albuterol Q6 hour PRN via MDI. Would avoid nebulized preparations to limit risk of aerosol formation.    #Afib w/ RVR  Now in sinus rhythm   ~serial Lisbet/EKGs  ~cont. Heparin gtt per protocol
speeach and swallow and palliative care to see.

## 2022-01-01 NOTE — H&P ADULT - NSICDXPASTMEDICALHX_GEN_ALL_CORE_FT
English
PAST MEDICAL HISTORY:  BPH (benign prostatic hyperplasia)     H/O gastroesophageal reflux (GERD)     History of IBS     Neuropathy, idiopathic     Urinary retention

## 2022-05-03 NOTE — ED ADULT TRIAGE NOTE - NS ED TRIAGE AVPU SCALE
Detail Level: Generalized
Alert-The patient is alert, awake and responds to voice. The patient is oriented to time, place, and person. The triage nurse is able to obtain subjective information.

## 2023-04-17 NOTE — GOALS OF CARE CONVERSATION - ADVANCED CARE PLANNING - NS PRO AD PATIENT TYPE
Health Care Proxy (HCP)
Primary Diagnosis:  Psychotic disorder [F29]        Problem Dx:

## 2023-09-06 NOTE — ED PROVIDER NOTE - CPE EDP GASTRO NORM
Received PA for patients allegra. Completed via [employer] fax that patient needs to try and fail 2 preferred medications. (Zyrtec and Claritin)    Patient has Clay Guzman, they will have to purchase it OTC.     LM for mother to call back regarding medication normal...

## 2023-09-25 NOTE — ED ADULT NURSE NOTE - NSFALLRSKASSESSTYPE_ED_ALL_ED
[facility]    Brief Operative Note    Pre-operative diagnosis: Symptomatic cholelithiasis [K80.20]  Post-operative diagnosis Same as pre-operative diagnosis    Procedure: Procedure(s):  Laparoscopic cholecystectomy  Surgeon: Surgeon(s) and Role:     * Filemon Sharma MD - Primary     * Demarcus Suarez MD - Resident - Assisting  Anesthesia: General   Estimated Blood Loss: 10 mL from 9/25/2023 11:57 AM to 9/25/2023  1:27 PM      Drains: None  Specimens:   ID Type Source Tests Collected by Time Destination   1 : GALLBLADDER AND CONTENTS Tissue Gallbladder SURGICAL PATHOLOGY EXAM Filemon Sharma MD 9/25/2023 12:43 PM      Findings:   Intrahepatic gallbladder .  Complications: None.  Implants: * No implants in log *          
Initial (On Arrival)

## 2023-10-30 NOTE — ED PROVIDER NOTE - CHPI ED SYMPTOMS POS
URINARY RETENTION/HEMATURIA Oral Minoxidil Counseling- I discussed with the patient the risks of oral minoxidil including but not limited to shortness of breath, swelling of the feet or ankles, dizziness, lightheadedness, unwanted hair growth and allergic reaction.  The patient verbalized understanding of the proper use and possible adverse effects of oral minoxidil.  All of the patient's questions and concerns were addressed.

## 2024-01-07 NOTE — DIETITIAN INITIAL EVALUATION ADULT. - NUTRITIONGOAL OUTCOME1
Pt will complete > 75% at each meal and take supplement

## 2024-05-21 NOTE — ED ADULT NURSE NOTE - PAIN: PRESENCE, MLM
Leila  (on phone consent) notified and verbalized understanding.   States patient already has been wearing compression socks, abdominal binder, and is staying well hydrated. States his BP readings given below are when he has been doing all these recommendations. She would like medication changes at this point    Routed to Dr. Maier and sujata to advise     denies pain/discomfort

## 2025-04-29 NOTE — ED ADULT NURSE NOTE - NSIMPLEMENTINTERV_GEN_ALL_ED
Detail Level: Detailed Products Recommended: ELTA md clear General Sunscreen Counseling: I recommended a broad spectrum sunscreen with a SPF of 30 or higher.  I explained that SPF 30 sunscreens block approximately 97 percent of the sun's harmful rays.  Sunscreens should be applied at least 15 minutes prior to expected sun exposure and then every 2 hours after that as long as sun exposure continues. If swimming or exercising sunscreen should be reapplied every 45 minutes to an hour after getting wet or sweating.  One ounce, or the equivalent of a shot glass full of sunscreen, is adequate to protect the skin not covered by a bathing suit. I also recommended a lip balm with a sunscreen as well. Sun protective clothing can be used in lieu of sunscreen but must be worn the entire time you are exposed to the sun's rays. Implemented All Fall with Harm Risk Interventions:  [place] to call system. Call bell, personal items and telephone within reach. Instruct patient to call for assistance. Room bathroom lighting operational. Non-slip footwear when patient is off stretcher. Physically safe environment: no spills, clutter or unnecessary equipment. Stretcher in lowest position, wheels locked, appropriate side rails in place. Provide visual cue, wrist band, yellow gown, etc. Monitor gait and stability. Monitor for mental status changes and reorient to person, place, and time. Review medications for side effects contributing to fall risk. Reinforce activity limits and safety measures with patient and family. Provide visual clues: red socks.